# Patient Record
Sex: FEMALE | Race: WHITE | NOT HISPANIC OR LATINO | Employment: FULL TIME | URBAN - METROPOLITAN AREA
[De-identification: names, ages, dates, MRNs, and addresses within clinical notes are randomized per-mention and may not be internally consistent; named-entity substitution may affect disease eponyms.]

---

## 2017-02-08 RX ORDER — LISINOPRIL 5 MG/1
TABLET ORAL
Qty: 30 TABLET | Refills: 0 | Status: SHIPPED | OUTPATIENT
Start: 2017-02-08 | End: 2017-03-09 | Stop reason: SDUPTHER

## 2017-03-09 RX ORDER — LISINOPRIL 5 MG/1
TABLET ORAL
Qty: 30 TABLET | Refills: 2 | Status: SHIPPED | OUTPATIENT
Start: 2017-03-09 | End: 2017-06-12 | Stop reason: SDUPTHER

## 2017-03-22 ENCOUNTER — DOCUMENTATION (OUTPATIENT)
Dept: PHYSICAL THERAPY | Facility: CLINIC | Age: 47
End: 2017-03-22

## 2017-03-22 NOTE — PROGRESS NOTES
Discharge Summary  Discharge Summary from Physical Therapy Report      Dates  PT visit: 11/14/16, 11/28/16  Number of Visits: 2     Discharge Status of Patient: See daily note dated 11/28/16    Goals: Not Met    Discharge Plan: Continue with current home exercise program as instructed    Comments Patient attended only 2 visits of PT    Date of Discharge 03/22/17        Carolina Schwab, PT, DPT  Physical Therapist

## 2017-06-12 RX ORDER — LISINOPRIL 5 MG/1
TABLET ORAL
Qty: 30 TABLET | Refills: 2 | Status: SHIPPED | OUTPATIENT
Start: 2017-06-12 | End: 2017-09-12 | Stop reason: SDUPTHER

## 2017-06-15 ENCOUNTER — TELEPHONE (OUTPATIENT)
Dept: OBSTETRICS AND GYNECOLOGY | Facility: CLINIC | Age: 47
End: 2017-06-15

## 2017-09-12 RX ORDER — LISINOPRIL 5 MG/1
TABLET ORAL
Qty: 30 TABLET | Refills: 5 | Status: SHIPPED | OUTPATIENT
Start: 2017-09-12 | End: 2017-09-14

## 2017-09-14 ENCOUNTER — OFFICE VISIT (OUTPATIENT)
Dept: OBSTETRICS AND GYNECOLOGY | Facility: CLINIC | Age: 47
End: 2017-09-14

## 2017-09-14 VITALS
BODY MASS INDEX: 36.36 KG/M2 | DIASTOLIC BLOOD PRESSURE: 100 MMHG | SYSTOLIC BLOOD PRESSURE: 150 MMHG | WEIGHT: 205.2 LBS | HEIGHT: 63 IN

## 2017-09-14 DIAGNOSIS — N95.1 MENOPAUSAL SYMPTOMS: ICD-10-CM

## 2017-09-14 DIAGNOSIS — Z01.419 ENCOUNTER FOR GYNECOLOGICAL EXAMINATION WITHOUT ABNORMAL FINDING: Primary | ICD-10-CM

## 2017-09-14 DIAGNOSIS — R63.5 WEIGHT GAIN: ICD-10-CM

## 2017-09-14 PROCEDURE — 99396 PREV VISIT EST AGE 40-64: CPT | Performed by: OBSTETRICS & GYNECOLOGY

## 2017-09-14 NOTE — PROGRESS NOTES
Subjective    Chief Complaint   Patient presents with   • Gynecologic Exam     Discuss hormone issue      History of Present Illness    Nae Nicole is a 47 y.o. female who presents for annual exam. Mammogram today.  Patient wants to try Premarin 1.25 mg a day for extreme hot flashes so samples given with Rx.  Discussed calcium with vitamin D.  Patient has had a hysterectomy with BSO.  She is also had some weight gain and wants thyroid checked.    Obstetric History:  OB History      Para Term  AB TAB SAB Ectopic Multiple Living    2 2 2                Menstrual History:     No LMP recorded.       Past Medical History:   Diagnosis Date   • Abnormal thyroid blood test    • Abnormal weight gain    • GERD (gastroesophageal reflux disease)    • Hypertension    • Migraine    • Obesity    • Snores      Family History   Problem Relation Age of Onset   • Hypertension Father    • Hypothyroidism Maternal Aunt    • Diabetes Maternal Grandmother        The following portions of the patient's history were reviewed and updated as appropriate: allergies, current medications, past family history, past medical history, past social history, past surgical history and problem list.    Review of Systems   Constitutional: Positive for unexpected weight change. Negative for fever.   HENT: Negative.    Respiratory: Negative for shortness of breath and wheezing.    Cardiovascular: Negative for chest pain, palpitations and leg swelling.   Gastrointestinal: Negative for abdominal pain, anal bleeding and blood in stool.   Genitourinary: Negative for dysuria, pelvic pain, urgency, vaginal bleeding, vaginal discharge and vaginal pain.   Skin: Negative.    Neurological: Negative.         Hot flashes severe   Hematological: Negative.  Negative for adenopathy.   Psychiatric/Behavioral: Negative.  Negative for dysphoric mood. The patient is not nervous/anxious.             Objective   Physical Exam   Constitutional: She is oriented to  "person, place, and time. She appears well-developed and well-nourished.   HENT:   Head: Normocephalic.   Eyes: Pupils are equal, round, and reactive to light.   Neck: No tracheal deviation present. No thyromegaly present.   Cardiovascular: Normal rate, regular rhythm and normal heart sounds.    No murmur heard.  Pulmonary/Chest: Effort normal and breath sounds normal. No respiratory distress.   Breasts without masses, tenderness or nipple discharge   Abdominal: Soft. She exhibits no mass. There is no hepatosplenomegaly. There is no tenderness. No hernia.   Genitourinary: Rectum normal and vagina normal. No vaginal discharge found.   Genitourinary Comments: External genitalia normal   Lymphadenopathy:     She has no cervical adenopathy.     She has no axillary adenopathy.        Right: No inguinal adenopathy present.        Left: No inguinal adenopathy present.   Neurological: She is alert and oriented to person, place, and time.   Skin: Skin is warm and dry. No rash noted.   Psychiatric: She has a normal mood and affect. Her behavior is normal.   Vitals reviewed.      /100  Ht 63\" (160 cm)  Wt 205 lb 3.2 oz (93.1 kg)  BMI 36.35 kg/m2    Assessment/Plan   Nae was seen today for gynecologic exam.    Diagnoses and all orders for this visit:    Encounter for gynecological examination without abnormal finding  -     IGP,rfx Aptima HPV All Pth    Menopausal symptoms    Weight gain  -     Thyroid Panel With TSH    Other orders  -     estrogens, conjugated, (PREMARIN) 1.25 MG tablet; Take 1 tablet by mouth Daily.      Mammogram. RTO 1 year  Counseled about the importance of calcium with vitamin D for osteoporosis prevention.           "

## 2017-09-15 LAB
FT4I SERPL CALC-MCNC: 1.8 (ref 1.2–4.9)
T3RU NFR SERPL: 28 % (ref 24–39)
T4 SERPL-MCNC: 6.4 UG/DL (ref 4.5–12)
TSH SERPL DL<=0.005 MIU/L-ACNC: 3.73 UIU/ML (ref 0.45–4.5)

## 2017-09-19 LAB
CONV .: NORMAL
CYTOLOGIST CVX/VAG CYTO: NORMAL
CYTOLOGY CVX/VAG DOC THIN PREP: NORMAL
DX ICD CODE: NORMAL
HIV 1 & 2 AB SER-IMP: NORMAL
OTHER STN SPEC: NORMAL
PATH REPORT.FINAL DX SPEC: NORMAL
STAT OF ADQ CVX/VAG CYTO-IMP: NORMAL

## 2017-09-20 ENCOUNTER — TELEPHONE (OUTPATIENT)
Dept: OBSTETRICS AND GYNECOLOGY | Facility: CLINIC | Age: 47
End: 2017-09-20

## 2017-09-28 ENCOUNTER — TELEPHONE (OUTPATIENT)
Dept: OBSTETRICS AND GYNECOLOGY | Facility: CLINIC | Age: 47
End: 2017-09-28

## 2017-09-28 NOTE — TELEPHONE ENCOUNTER
----- Message from Dionicio Alfaro MD sent at 9/18/2017  4:31 PM EDT -----  Kofi please call patient and let her know her thyroid tests were totally normal.  Thank you.  ANY

## 2017-09-28 NOTE — TELEPHONE ENCOUNTER
Premarin tablets were very expensive.  Patient has used Estratest as well as patches in the past.  Estradiol 2 mg has not been working.  Patient will contact the pharmacy and see which her best financial options are.  ANY

## 2017-10-02 ENCOUNTER — TELEPHONE (OUTPATIENT)
Dept: OBSTETRICS AND GYNECOLOGY | Facility: CLINIC | Age: 47
End: 2017-10-02

## 2017-10-02 NOTE — TELEPHONE ENCOUNTER
Patient called back and said Estratest full strength was covered by her insurance.  #30 called into pharmacy with 12 refills.  ANY

## 2017-10-02 NOTE — TELEPHONE ENCOUNTER
Patient is considering Estratest full strength generic.  She will call her pharmacy first to see the cost and whether it is in fact still available and will let me know tomorrow.  ANY

## 2017-11-06 ENCOUNTER — TELEPHONE (OUTPATIENT)
Dept: OBSTETRICS AND GYNECOLOGY | Facility: CLINIC | Age: 47
End: 2017-11-06

## 2017-11-06 NOTE — TELEPHONE ENCOUNTER
Kofi this patient has called and wanted a refill for her full strength Estratest sent to her pharmacy.  This was already reportedly called in.  Could you check with her and make sure it was done properly.  She should be I believe on full strength Estratest generic, #30, one by mouth a day and 12 refills.  Thank you.  ANY

## 2018-03-19 RX ORDER — LISINOPRIL 5 MG/1
TABLET ORAL
Qty: 30 TABLET | Refills: 0 | Status: SHIPPED | OUTPATIENT
Start: 2018-03-19 | End: 2018-04-02

## 2018-04-02 ENCOUNTER — OFFICE VISIT (OUTPATIENT)
Dept: FAMILY MEDICINE CLINIC | Facility: CLINIC | Age: 48
End: 2018-04-02

## 2018-04-02 VITALS
SYSTOLIC BLOOD PRESSURE: 140 MMHG | WEIGHT: 202 LBS | OXYGEN SATURATION: 98 % | DIASTOLIC BLOOD PRESSURE: 88 MMHG | TEMPERATURE: 98.4 F | BODY MASS INDEX: 35.79 KG/M2 | HEIGHT: 63 IN | HEART RATE: 67 BPM

## 2018-04-02 DIAGNOSIS — Z13.220 LIPID SCREENING: ICD-10-CM

## 2018-04-02 DIAGNOSIS — G43.109 MIGRAINE WITH AURA AND WITHOUT STATUS MIGRAINOSUS, NOT INTRACTABLE: ICD-10-CM

## 2018-04-02 DIAGNOSIS — I10 BENIGN ESSENTIAL HYPERTENSION: Primary | ICD-10-CM

## 2018-04-02 PROCEDURE — 99214 OFFICE O/P EST MOD 30 MIN: CPT | Performed by: NURSE PRACTITIONER

## 2018-04-02 RX ORDER — SUMATRIPTAN 50 MG/1
50 TABLET, FILM COATED ORAL ONCE AS NEEDED
Qty: 9 TABLET | Refills: 0 | Status: SHIPPED | OUTPATIENT
Start: 2018-04-02 | End: 2019-07-26 | Stop reason: SDUPTHER

## 2018-04-02 RX ORDER — ESTERIFIED ESTROGEN AND METHYLTESTOSTERONE 1.25; 2.5 MG/1; MG/1
TABLET ORAL DAILY
Refills: 4 | COMMUNITY
Start: 2018-03-26 | End: 2019-04-26

## 2018-04-02 RX ORDER — LISINOPRIL 10 MG/1
10 TABLET ORAL DAILY
Qty: 30 TABLET | Refills: 0 | Status: SHIPPED | OUTPATIENT
Start: 2018-04-02 | End: 2018-05-17 | Stop reason: SDUPTHER

## 2018-04-02 NOTE — PROGRESS NOTES
"Subjective   Nae Nicole is a 48 y.o. female who presents for a routine follow up on hypertension. BP running high on home readings.     History of Present Illness   Noted higher BP for 9 months. Bottom number primarily elevated at home 95, has been off and on phentermine, not currently on and BP still high.   Follows with gyn for women's health.   Doing ok with turning 48, started weight watchers, finishing her first full week.  excedrin migraine does work for headaches, bitemporal, occ flashes in eyes, none down in the bed sick with in quite some time.   The following portions of the patient's history were reviewed and updated as appropriate: allergies, current medications, past family history, past medical history, past social history, past surgical history and problem list.    Review of Systems   Constitutional: Negative for activity change, appetite change, fatigue, unexpected weight gain and unexpected weight loss.   Respiratory: Negative.  Negative for shortness of breath.    Cardiovascular: Negative.  Negative for chest pain, palpitations and leg swelling.   Neurological: Positive for headaches (attributes to sinus or allergies. ).   Psychiatric/Behavioral: Negative.      /88   Pulse 67   Temp 98.4 °F (36.9 °C) (Oral)   Ht 160 cm (63\")   Wt 91.6 kg (202 lb)   SpO2 98%   BMI 35.78 kg/m²     Objective   Physical Exam   Constitutional: She appears well-developed and well-nourished.   Neck: Normal range of motion. Neck supple. No thyromegaly present.   Cardiovascular: Normal rate, regular rhythm and normal heart sounds.    Pulmonary/Chest: Effort normal and breath sounds normal.   Lymphadenopathy:     She has no cervical adenopathy.   Skin: Skin is warm and dry.   Psychiatric: She has a normal mood and affect. Her behavior is normal. Judgment and thought content normal.   Nursing note and vitals reviewed.    Assessment/Plan   Problems Addressed this Visit        Cardiovascular and Mediastinum    " Benign essential hypertension - Primary    Relevant Medications    lisinopril (PRINIVIL,ZESTRIL) 10 MG tablet    Other Relevant Orders    CBC & Differential    Comprehensive Metabolic Panel    Migraine    Relevant Medications    SUMAtriptan (IMITREX) 50 MG tablet       Other    Lipid screening    Relevant Orders    Lipid Panel      Other Visit Diagnoses    None.       HTN uncontrolled. Will increase lisinopril to 10mg, consider add back more regular HCTZ to control BP better.   Reasonable for imitrex to address headaches, as had success in the past.   FU 1 month for BP check, outpt fasting labs.   Continue care with gyn, colon cancer screening up to date.

## 2018-04-23 RX ORDER — LISINOPRIL 5 MG/1
TABLET ORAL
Qty: 30 TABLET | Refills: 0 | OUTPATIENT
Start: 2018-04-23

## 2018-05-17 ENCOUNTER — CLINICAL SUPPORT (OUTPATIENT)
Dept: FAMILY MEDICINE CLINIC | Facility: CLINIC | Age: 48
End: 2018-05-17

## 2018-05-17 VITALS
WEIGHT: 196 LBS | BODY MASS INDEX: 34.73 KG/M2 | DIASTOLIC BLOOD PRESSURE: 80 MMHG | HEIGHT: 63 IN | OXYGEN SATURATION: 96 % | HEART RATE: 84 BPM | SYSTOLIC BLOOD PRESSURE: 144 MMHG

## 2018-05-17 RX ORDER — LISINOPRIL 10 MG/1
10 TABLET ORAL DAILY
Qty: 30 TABLET | Refills: 0 | Status: SHIPPED | OUTPATIENT
Start: 2018-05-17 | End: 2018-06-20 | Stop reason: SDUPTHER

## 2018-06-21 RX ORDER — LISINOPRIL 10 MG/1
10 TABLET ORAL DAILY
Qty: 30 TABLET | Refills: 0 | Status: SHIPPED | OUTPATIENT
Start: 2018-06-21 | End: 2018-08-01 | Stop reason: SDUPTHER

## 2018-08-02 RX ORDER — LISINOPRIL 10 MG/1
10 TABLET ORAL DAILY
Qty: 30 TABLET | Refills: 3 | Status: SHIPPED | OUTPATIENT
Start: 2018-08-02 | End: 2020-08-19

## 2018-09-12 RX ORDER — LISINOPRIL 10 MG/1
10 TABLET ORAL DAILY
Qty: 30 TABLET | Refills: 3 | Status: SHIPPED | OUTPATIENT
Start: 2018-09-12 | End: 2018-09-27 | Stop reason: SDUPTHER

## 2018-09-27 ENCOUNTER — OFFICE VISIT (OUTPATIENT)
Dept: FAMILY MEDICINE CLINIC | Facility: CLINIC | Age: 48
End: 2018-09-27

## 2018-09-27 VITALS
WEIGHT: 195 LBS | OXYGEN SATURATION: 96 % | DIASTOLIC BLOOD PRESSURE: 80 MMHG | HEIGHT: 63 IN | TEMPERATURE: 98.4 F | HEART RATE: 72 BPM | BODY MASS INDEX: 34.55 KG/M2 | SYSTOLIC BLOOD PRESSURE: 138 MMHG

## 2018-09-27 DIAGNOSIS — J01.10 ACUTE NON-RECURRENT FRONTAL SINUSITIS: Primary | ICD-10-CM

## 2018-09-27 PROCEDURE — 99213 OFFICE O/P EST LOW 20 MIN: CPT | Performed by: NURSE PRACTITIONER

## 2018-09-27 RX ORDER — AMOXICILLIN 500 MG/1
1000 CAPSULE ORAL 2 TIMES DAILY
Qty: 28 CAPSULE | Refills: 0 | Status: SHIPPED | OUTPATIENT
Start: 2018-09-27 | End: 2019-04-26

## 2018-09-27 NOTE — PROGRESS NOTES
"Subjective   Nae Nicole is a 48 y.o. female who presents c/o cough, congestion, headache x 5 days. Was also dx with vertigo at immediate care last week.     History of Present Illness   Vertigo is resolved, short lived only. Was severe. Now with cough and congestion, headache x 5 days. Taking dayquil day and nighttime.   The following portions of the patient's history were reviewed and updated as appropriate: allergies, current medications, past family history, past medical history, past social history, past surgical history and problem list.    Review of Systems   Constitutional: Negative for chills and fever.   HENT: Positive for congestion, rhinorrhea, sinus pressure and sore throat.    Eyes: Positive for photophobia.   Respiratory: Positive for cough and chest tightness.    Cardiovascular: Negative.    Gastrointestinal: Negative.    Neurological: Positive for headache.     /80   Pulse 72   Temp 98.4 °F (36.9 °C) (Oral)   Ht 160 cm (63\")   Wt 88.5 kg (195 lb)   SpO2 96%   BMI 34.54 kg/m²     Objective   Physical Exam   Constitutional: She appears well-developed and well-nourished.   HENT:   Right Ear: A middle ear effusion is present.   Left Ear: A middle ear effusion is present.   Nose: Mucosal edema and rhinorrhea present. Right sinus exhibits frontal sinus tenderness. Left sinus exhibits frontal sinus tenderness.   Mouth/Throat: Uvula is midline and mucous membranes are normal. Posterior oropharyngeal erythema present. No oropharyngeal exudate.   Neck: Normal range of motion. Neck supple. No thyromegaly present.   Cardiovascular: Normal rate, regular rhythm and normal heart sounds.    Pulmonary/Chest: Effort normal and breath sounds normal.   Lymphadenopathy:     She has no cervical adenopathy.   Skin: Skin is warm and dry.   Psychiatric: She has a normal mood and affect. Her behavior is normal. Judgment and thought content normal.   Nursing note and vitals reviewed.    Assessment/Plan "   Problems Addressed this Visit     None      Visit Diagnoses     Acute non-recurrent frontal sinusitis    -  Primary    Relevant Medications    amoxicillin (AMOXIL) 500 MG capsule        Continue symptom controllers, follow up 1 week if symptoms not settling.

## 2018-11-14 DIAGNOSIS — Z12.31 VISIT FOR SCREENING MAMMOGRAM: Primary | ICD-10-CM

## 2018-11-16 ENCOUNTER — TRANSCRIBE ORDERS (OUTPATIENT)
Dept: ADMINISTRATIVE | Facility: HOSPITAL | Age: 48
End: 2018-11-16

## 2018-11-16 DIAGNOSIS — Z12.31 VISIT FOR SCREENING MAMMOGRAM: Primary | ICD-10-CM

## 2018-11-20 ENCOUNTER — APPOINTMENT (OUTPATIENT)
Dept: MAMMOGRAPHY | Facility: HOSPITAL | Age: 48
End: 2018-11-20
Attending: OBSTETRICS & GYNECOLOGY

## 2018-12-24 RX ORDER — ESTERIFIED ESTROGEN AND METHYLTESTOSTERONE 1.25; 2.5 MG/1; MG/1
TABLET ORAL
Qty: 30 TABLET | Refills: 0 | OUTPATIENT
Start: 2018-12-24

## 2018-12-24 NOTE — TELEPHONE ENCOUNTER
If you want pt to have this refll can you please send to pharm. It will not let me approve this medication.

## 2018-12-24 NOTE — TELEPHONE ENCOUNTER
Estratest cannot be e-prescribed.  Please call in full strength Estratest generic, #30, one by mouth a day, 5 refills.  Thank you.  ANY

## 2018-12-28 ENCOUNTER — APPOINTMENT (OUTPATIENT)
Dept: MAMMOGRAPHY | Facility: HOSPITAL | Age: 48
End: 2018-12-28
Attending: OBSTETRICS & GYNECOLOGY

## 2019-01-31 ENCOUNTER — TELEPHONE (OUTPATIENT)
Dept: OBSTETRICS AND GYNECOLOGY | Facility: CLINIC | Age: 49
End: 2019-01-31

## 2019-02-04 ENCOUNTER — TELEPHONE (OUTPATIENT)
Dept: OBSTETRICS AND GYNECOLOGY | Facility: CLINIC | Age: 49
End: 2019-02-04

## 2019-02-04 NOTE — TELEPHONE ENCOUNTER
After speaking with Dr. Alfaor we called in estradiol 1mg #90 with 3 refills which was on her list from the ins computer. heather

## 2019-02-04 NOTE — TELEPHONE ENCOUNTER
----- Message from Rashida Bullard sent at 2/4/2019 11:46 AM EST -----  PT calling to speak with you regarding issues getting medications. PT #[phone]

## 2019-02-25 RX ORDER — LISINOPRIL 10 MG/1
10 TABLET ORAL DAILY
Qty: 30 TABLET | Refills: 3 | Status: SHIPPED | OUTPATIENT
Start: 2019-02-25 | End: 2019-04-26 | Stop reason: SDUPTHER

## 2019-02-27 ENCOUNTER — OFFICE VISIT (OUTPATIENT)
Dept: OBSTETRICS AND GYNECOLOGY | Facility: CLINIC | Age: 49
End: 2019-02-27

## 2019-02-27 VITALS
BODY MASS INDEX: 37.43 KG/M2 | HEIGHT: 62 IN | WEIGHT: 203.4 LBS | DIASTOLIC BLOOD PRESSURE: 78 MMHG | SYSTOLIC BLOOD PRESSURE: 158 MMHG

## 2019-02-27 DIAGNOSIS — Z12.39 SCREENING FOR BREAST CANCER: ICD-10-CM

## 2019-02-27 DIAGNOSIS — N95.1 MENOPAUSAL SYMPTOMS: ICD-10-CM

## 2019-02-27 DIAGNOSIS — Z78.0 MENOPAUSE: ICD-10-CM

## 2019-02-27 DIAGNOSIS — Z01.419 ENCOUNTER FOR GYNECOLOGICAL EXAMINATION WITHOUT ABNORMAL FINDING: Primary | ICD-10-CM

## 2019-02-27 LAB
DEVELOPER EXPIRATION DATE: ABNORMAL
DEVELOPER LOT NUMBER: ABNORMAL
EXPIRATION DATE: ABNORMAL
FECAL OCCULT BLOOD SCREEN, POC: NEGATIVE
Lab: ABNORMAL
NEGATIVE CONTROL: NEGATIVE
POSITIVE CONTROL: POSITIVE

## 2019-02-27 PROCEDURE — 99396 PREV VISIT EST AGE 40-64: CPT | Performed by: OBSTETRICS & GYNECOLOGY

## 2019-02-27 PROCEDURE — 82274 ASSAY TEST FOR BLOOD FECAL: CPT | Performed by: OBSTETRICS & GYNECOLOGY

## 2019-02-27 RX ORDER — ESTRADIOL 1 MG/1
1 TABLET ORAL DAILY
COMMUNITY
End: 2019-04-26

## 2019-02-27 RX ORDER — ESTRADIOL 2 MG/1
2 TABLET ORAL DAILY
Qty: 30 TABLET | Refills: 12
Start: 2019-02-27 | End: 2021-05-03

## 2019-02-27 NOTE — PROGRESS NOTES
Subjective    Chief Complaint   Patient presents with   • Gynecologic Exam     ae, hyst w/bso, hot flashes, very moddy crying, had to change medication because ins would not cover, dosent feel like medication not strong enough       History of Present Illness    Nae Nicole is a 49 y.o. female who presents for annual exam.  Non-smoker.  Scheduling mammogram and DEXA scan.  Colonoscopy discussed and patient will decide.  Having a lot of hot flashes on 1 mg estradiol and wants to increase to 2 mg.  Previous hysterectomy with BSO.    Obstetric History:  OB History      Para Term  AB Living    2 2 2          SAB TAB Ectopic Molar Multiple Live Births                        Menstrual History:     No LMP recorded. Patient has had a hysterectomy.       Past Medical History:   Diagnosis Date   • Abnormal thyroid blood test    • Abnormal weight gain    • GERD (gastroesophageal reflux disease)    • Hypertension    • Migraine    • Obesity    • Snores      Family History   Problem Relation Age of Onset   • Hypertension Father    • Hypothyroidism Maternal Aunt    • Diabetes Maternal Grandmother      Social History     Tobacco Use   Smoking Status Never Smoker   Smokeless Tobacco Never Used     Counseling given: Not Answered      The following portions of the patient's history were reviewed and updated as appropriate: allergies, current medications, past family history, past medical history, past social history, past surgical history and problem list.    Review of Systems   Constitutional: Negative.  Negative for fever and unexpected weight change.   HENT: Negative.    Respiratory: Negative for shortness of breath and wheezing.    Cardiovascular: Negative for chest pain, palpitations and leg swelling.   Gastrointestinal: Negative for abdominal pain, anal bleeding and blood in stool.   Genitourinary: Negative for dysuria, pelvic pain, urgency, vaginal bleeding, vaginal discharge and vaginal pain.   Skin: Negative.   "  Neurological: Negative.         + Hot Flashes   Hematological: Negative.  Negative for adenopathy.   Psychiatric/Behavioral: Negative.  Negative for dysphoric mood. The patient is not nervous/anxious.             Objective   Physical Exam   Constitutional: She is oriented to person, place, and time. She appears well-developed and well-nourished.   HENT:   Head: Normocephalic.   Eyes: Pupils are equal, round, and reactive to light.   Neck: No tracheal deviation present. No thyromegaly present.   Cardiovascular: Normal rate, regular rhythm and normal heart sounds.   No murmur heard.  Pulmonary/Chest: Effort normal and breath sounds normal. No respiratory distress.   Breasts without masses, tenderness or nipple discharge   Abdominal: Soft. She exhibits no mass. There is no hepatosplenomegaly. There is no tenderness. No hernia.   Genitourinary: Rectum normal and vagina normal. Rectal exam shows guaiac negative stool. No vaginal discharge found.   Genitourinary Comments: External genitalia normal   Lymphadenopathy:     She has no cervical adenopathy.     She has no axillary adenopathy.        Right: No inguinal adenopathy present.        Left: No inguinal adenopathy present.   Neurological: She is alert and oriented to person, place, and time.   Skin: Skin is warm and dry. No rash noted.   Psychiatric: She has a normal mood and affect. Her behavior is normal.   Vitals reviewed.      /78   Ht 157.5 cm (62\")   Wt 92.3 kg (203 lb 6.4 oz)   BMI 37.20 kg/m²     Assessment/Plan   Nae was seen today for gynecologic exam.    Diagnoses and all orders for this visit:    Encounter for gynecological examination without abnormal finding  -     IGP,rfx Aptima HPV All Pth  -     POC Occult Blood Stool    Menopausal symptoms    Screening for breast cancer    Menopause    Other orders  -     estradiol (ESTRACE) 2 MG tablet; Take 1 tablet by mouth Daily.        Mammogram. Dexascan   RTO 1 year     Counseled about starting " calcium with vitamin D.  Also counseled about colorectal screening.

## 2019-03-13 ENCOUNTER — APPOINTMENT (OUTPATIENT)
Dept: WOMENS IMAGING | Facility: HOSPITAL | Age: 49
End: 2019-03-13

## 2019-03-13 ENCOUNTER — PROCEDURE VISIT (OUTPATIENT)
Dept: OBSTETRICS AND GYNECOLOGY | Facility: CLINIC | Age: 49
End: 2019-03-13

## 2019-03-13 DIAGNOSIS — Z78.0 POST-MENOPAUSAL: ICD-10-CM

## 2019-03-13 DIAGNOSIS — Z00.00 PREVENTATIVE HEALTH CARE: Primary | ICD-10-CM

## 2019-03-13 DIAGNOSIS — Z12.31 VISIT FOR SCREENING MAMMOGRAM: Primary | ICD-10-CM

## 2019-03-13 PROCEDURE — 77080 DXA BONE DENSITY AXIAL: CPT | Performed by: OBSTETRICS & GYNECOLOGY

## 2019-03-13 PROCEDURE — 77067 SCR MAMMO BI INCL CAD: CPT | Performed by: OBSTETRICS & GYNECOLOGY

## 2019-03-13 PROCEDURE — 77067 SCR MAMMO BI INCL CAD: CPT | Performed by: RADIOLOGY

## 2019-03-14 ENCOUNTER — TELEPHONE (OUTPATIENT)
Dept: OBSTETRICS AND GYNECOLOGY | Facility: CLINIC | Age: 49
End: 2019-03-14

## 2019-04-26 ENCOUNTER — OFFICE VISIT (OUTPATIENT)
Dept: FAMILY MEDICINE CLINIC | Facility: CLINIC | Age: 49
End: 2019-04-26

## 2019-04-26 VITALS
BODY MASS INDEX: 36.99 KG/M2 | DIASTOLIC BLOOD PRESSURE: 74 MMHG | SYSTOLIC BLOOD PRESSURE: 138 MMHG | TEMPERATURE: 98.3 F | HEIGHT: 62 IN | WEIGHT: 201 LBS | HEART RATE: 66 BPM | OXYGEN SATURATION: 98 %

## 2019-04-26 DIAGNOSIS — B37.31 YEAST VAGINITIS: ICD-10-CM

## 2019-04-26 DIAGNOSIS — R39.9 UTI SYMPTOMS: Primary | ICD-10-CM

## 2019-04-26 LAB
BILIRUB BLD-MCNC: NEGATIVE MG/DL
CLARITY, POC: CLEAR
COLOR UR: YELLOW
GLUCOSE UR STRIP-MCNC: NEGATIVE MG/DL
KETONES UR QL: NEGATIVE
LEUKOCYTE EST, POC: NEGATIVE
NITRITE UR-MCNC: NEGATIVE MG/ML
PH UR: 5 [PH] (ref 5–8)
PROT UR STRIP-MCNC: NEGATIVE MG/DL
RBC # UR STRIP: NEGATIVE /UL
SP GR UR: 1.01 (ref 1–1.03)
UROBILINOGEN UR QL: NORMAL

## 2019-04-26 PROCEDURE — 99213 OFFICE O/P EST LOW 20 MIN: CPT | Performed by: NURSE PRACTITIONER

## 2019-04-26 PROCEDURE — 81003 URINALYSIS AUTO W/O SCOPE: CPT | Performed by: NURSE PRACTITIONER

## 2019-04-26 RX ORDER — FLUCONAZOLE 150 MG/1
150 TABLET ORAL DAILY
Qty: 2 TABLET | Refills: 0 | Status: SHIPPED | OUTPATIENT
Start: 2019-04-26 | End: 2019-04-28

## 2019-04-26 NOTE — PROGRESS NOTES
"Subjective   Nae Nicole is a 49 y.o. female who presents c/o dysuria, frequent urination since last week.     History of Present Illness   Initially thought yeast infection and symptoms worsening, after treatment. Increasing back pain. Not drinking extra fluids. Having burning and itching.   The following portions of the patient's history were reviewed and updated as appropriate: allergies, current medications, past family history, past medical history, past social history, past surgical history and problem list.    Review of Systems   Constitutional: Negative for chills and fever.   Genitourinary: Positive for dysuria and frequency. Negative for urinary incontinence, difficulty urinating, flank pain and urgency.        Itching   Musculoskeletal: Positive for back pain.   Hematological: Negative.    Psychiatric/Behavioral: Negative.      /74   Pulse 66   Temp 98.3 °F (36.8 °C) (Oral)   Ht 157.5 cm (62\")   Wt 91.2 kg (201 lb)   SpO2 98%   BMI 36.76 kg/m²     Objective   Physical Exam   Constitutional: She appears well-developed and well-nourished. No distress.   Abdominal: Soft. There is no tenderness. There is no CVA tenderness.   Psychiatric: She has a normal mood and affect. Her speech is normal and behavior is normal.   Nursing note and vitals reviewed.    Assessment/Plan   Problems Addressed this Visit     None      Visit Diagnoses     UTI symptoms    -  Primary    Relevant Orders    POCT urinalysis dipstick, automated (Completed)    Yeast vaginitis        Relevant Medications    fluconazole (DIFLUCAN) 150 MG tablet        Yeast vaginitis--symptoms should be settled by Monday. Schedule annual physical    Results for orders placed or performed in visit on 04/26/19   POCT urinalysis dipstick, automated   Result Value Ref Range    Color Yellow Yellow, Straw, Dark Yellow, Keely    Clarity, UA Clear Clear    Specific Gravity  1.015 1.005 - 1.030    pH, Urine 5.0 5.0 - 8.0    Leukocytes Negative " Negative    Nitrite, UA Negative Negative    Protein, POC Negative Negative mg/dL    Glucose, UA Negative Negative, 1000 mg/dL (3+) mg/dL    Ketones, UA Negative Negative    Urobilinogen, UA Normal Normal    Bilirubin Negative Negative    Blood, UA Negative Negative

## 2019-05-02 RX ORDER — LISINOPRIL 10 MG/1
10 TABLET ORAL DAILY
Qty: 90 TABLET | Refills: 1 | Status: SHIPPED | OUTPATIENT
Start: 2019-05-02 | End: 2019-07-26 | Stop reason: SDUPTHER

## 2019-07-26 ENCOUNTER — OFFICE VISIT (OUTPATIENT)
Dept: FAMILY MEDICINE CLINIC | Facility: CLINIC | Age: 49
End: 2019-07-26

## 2019-07-26 VITALS
TEMPERATURE: 98.1 F | BODY MASS INDEX: 37.73 KG/M2 | SYSTOLIC BLOOD PRESSURE: 128 MMHG | DIASTOLIC BLOOD PRESSURE: 70 MMHG | WEIGHT: 205 LBS | HEART RATE: 73 BPM | HEIGHT: 62 IN | OXYGEN SATURATION: 96 %

## 2019-07-26 DIAGNOSIS — K58.2 IRRITABLE BOWEL SYNDROME WITH BOTH CONSTIPATION AND DIARRHEA: Primary | ICD-10-CM

## 2019-07-26 DIAGNOSIS — M79.671 RIGHT FOOT PAIN: ICD-10-CM

## 2019-07-26 PROCEDURE — 99213 OFFICE O/P EST LOW 20 MIN: CPT | Performed by: NURSE PRACTITIONER

## 2019-07-26 RX ORDER — DICYCLOMINE HYDROCHLORIDE 10 MG/1
10 CAPSULE ORAL
Qty: 120 CAPSULE | Refills: 3 | Status: SHIPPED | OUTPATIENT
Start: 2019-07-26 | End: 2021-05-03

## 2019-07-26 RX ORDER — SUMATRIPTAN 50 MG/1
50 TABLET, FILM COATED ORAL ONCE AS NEEDED
Qty: 9 TABLET | Refills: 3 | Status: SHIPPED | OUTPATIENT
Start: 2019-07-26

## 2019-07-26 NOTE — PROGRESS NOTES
"Subjective   Nae Nicole is a 49 y.o. female who presents c/o abdominal pain, bloating, diarrhea, vomiting off and on x several weeks.     History of Present Illness   3 weeks of lower abdominal cramping will lay in the bathroom floor. Eats salads a lot. Gets constipated, diarrhea will last all night long, then will normalize. Associated cramping. + nausea and vomiting. Had Monday, and not really eaten much since except French Fries as don't bother stomach. Gets swollen and bloated, can't stand clothing to touch it when swollen. Associated gas. +heartburn. Took some of mom's antispasm pills and phenergan, some help. Mom with diverticulitis. Has chronic constipation that has always managed with laxatives, but these cause cramping and diarrhea.   Pain in R foot where had cyst, now has pain all the time.   The following portions of the patient's history were reviewed and updated as appropriate: allergies, current medications, past family history, past medical history, past social history, past surgical history and problem list.    Review of Systems   Constitutional: Negative for chills and fever.   Respiratory: Negative.    Cardiovascular: Negative.    Gastrointestinal: Positive for abdominal distention, abdominal pain, constipation, diarrhea, nausea, vomiting and indigestion. Negative for anal bleeding, blood in stool and rectal pain.   Musculoskeletal: Positive for arthralgias.   Neurological: Negative.    Hematological: Negative.    Psychiatric/Behavioral: Negative.      /70   Pulse 73   Temp 98.1 °F (36.7 °C) (Oral)   Ht 157.5 cm (62\")   Wt 93 kg (205 lb)   SpO2 96%   BMI 37.49 kg/m²     Objective   Physical Exam   Constitutional: She appears well-developed and well-nourished. No distress.   Cardiovascular: Normal rate.   Pulmonary/Chest: Effort normal.   Abdominal: Soft. Bowel sounds are normal. She exhibits no distension. There is no hepatosplenomegaly or hepatomegaly. There is tenderness in the " left lower quadrant. There is no rigidity, no rebound, no guarding, no CVA tenderness, no tenderness at McBurney's point and negative Vargas's sign. No hernia.   Musculoskeletal:        Right ankle: Normal.        Right foot: There is tenderness and bony tenderness. There is normal range of motion, no swelling, normal capillary refill, no crepitus, no deformity and no laceration.   Skin: Skin is warm and dry.   Psychiatric: She has a normal mood and affect. Her behavior is normal. Judgment and thought content normal.   Nursing note and vitals reviewed.    Assessment/Plan   Problems Addressed this Visit     None      Visit Diagnoses     Irritable bowel syndrome with both constipation and diarrhea    -  Primary    Relevant Medications    dicyclomine (BENTYL) 10 MG capsule    Right foot pain            IBS--recommend miralax to manage constipation, trial of dicyclomine for the cramping. Consider GI referral if not settling.   R foot pain--recommend OTC NSAID to address prn

## 2019-12-09 RX ORDER — LISINOPRIL 10 MG/1
10 TABLET ORAL DAILY
Qty: 90 TABLET | Refills: 0 | Status: SHIPPED | OUTPATIENT
Start: 2019-12-09 | End: 2020-02-14

## 2020-01-06 ENCOUNTER — TELEPHONE (OUTPATIENT)
Dept: OBSTETRICS AND GYNECOLOGY | Facility: CLINIC | Age: 50
End: 2020-01-06

## 2020-01-06 NOTE — TELEPHONE ENCOUNTER
Patient called and said she does not really like estradiol due to it giving her hot flashes and making her rodgers. Patient said you had given her a couple samples of premarin before but her insurance wouldn't cover it, she said the insurance she has now will cover it and was wondering if you could call her in a prescription for it.

## 2020-01-06 NOTE — TELEPHONE ENCOUNTER
I can send Premarin 1.25 mg to her pharmacy.  I need to know which pharmacy she uses and how many pills at a time they will fill such as 1 month or 3 months.  Please let me know and I will put it in the computer.  Thank you.  ANY

## 2020-02-14 RX ORDER — LISINOPRIL 10 MG/1
10 TABLET ORAL DAILY
Qty: 90 TABLET | Refills: 0 | Status: SHIPPED | OUTPATIENT
Start: 2020-02-14 | End: 2020-05-11

## 2020-05-11 RX ORDER — LISINOPRIL 10 MG/1
10 TABLET ORAL DAILY
Qty: 90 TABLET | Refills: 0 | Status: SHIPPED | OUTPATIENT
Start: 2020-05-11 | End: 2020-08-14

## 2020-08-14 RX ORDER — LISINOPRIL 10 MG/1
10 TABLET ORAL DAILY
Qty: 90 TABLET | Refills: 0 | Status: SHIPPED | OUTPATIENT
Start: 2020-08-14 | End: 2020-08-19

## 2020-08-19 ENCOUNTER — OFFICE VISIT (OUTPATIENT)
Dept: FAMILY MEDICINE CLINIC | Facility: CLINIC | Age: 50
End: 2020-08-19

## 2020-08-19 DIAGNOSIS — I10 BENIGN ESSENTIAL HYPERTENSION: ICD-10-CM

## 2020-08-19 DIAGNOSIS — E66.01 MORBID OBESITY (HCC): ICD-10-CM

## 2020-08-19 DIAGNOSIS — Z11.59 NEED FOR HEPATITIS C SCREENING TEST: ICD-10-CM

## 2020-08-19 DIAGNOSIS — E78.2 MIXED HYPERLIPIDEMIA: Primary | ICD-10-CM

## 2020-08-19 DIAGNOSIS — G43.109 MIGRAINE WITH AURA AND WITHOUT STATUS MIGRAINOSUS, NOT INTRACTABLE: ICD-10-CM

## 2020-08-19 PROCEDURE — 99443 PR PHYS/QHP TELEPHONE EVALUATION 21-30 MIN: CPT | Performed by: NURSE PRACTITIONER

## 2020-08-19 RX ORDER — LISINOPRIL 10 MG/1
10 TABLET ORAL DAILY
Qty: 90 TABLET | Refills: 0 | Status: SHIPPED | OUTPATIENT
Start: 2020-08-19 | End: 2021-07-22

## 2020-08-19 RX ORDER — PHENTERMINE HYDROCHLORIDE 30 MG/1
30 CAPSULE ORAL EVERY MORNING
Qty: 30 CAPSULE | Refills: 0 | Status: SHIPPED | OUTPATIENT
Start: 2020-08-19 | End: 2021-05-03

## 2020-08-19 NOTE — PROGRESS NOTES
Subjective   Nae Nicole is a 50 y.o. female. Annual FU  History of Present Illness   Does think health is doing ok overall. Weight is up about 15 lbs and currently working as . 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 Has gained about 15 lbs and wonders about appetite suppressant. Migraines have been well controlled, but has had HA that she attributes to  The face shield she is wearing at work and computer glare.  The following portions of the patient's history were reviewed and updated as appropriate: allergies, current medications, past family history, past medical history, past social history, past surgical history and problem list.    Review of Systems   Constitutional: Positive for appetite change and unexpected weight gain. Negative for diaphoresis, fatigue and fever.   Respiratory: Negative.    Cardiovascular: Negative.    Gastrointestinal: Negative.    Endocrine: Negative.    Musculoskeletal: Positive for arthralgias.   Neurological: Positive for headache.   Psychiatric/Behavioral: Negative.        Objective   Physical Exam   Constitutional: She is oriented to person, place, and time. She appears well-developed and well-nourished. No distress.   Pulmonary/Chest: Effort normal.   Neurological: She is alert and oriented to person, place, and time.   Skin: Skin is dry. She is not diaphoretic.   Psychiatric: She has a normal mood and affect. Her behavior is normal. Judgment and thought content normal.     Assessment/Plan   Problems Addressed this Visit        Cardiovascular and Mediastinum    Benign essential hypertension    Relevant Medications    lisinopril (PRINIVIL,ZESTRIL) 10 MG tablet    Other Relevant Orders    CBC & Differential    Comprehensive Metabolic Panel    Migraine    Mixed hyperlipidemia - Primary    Relevant Orders    Lipid Panel       Digestive    Morbid obesity (CMS/HCC)    Relevant Medications    phentermine 30 MG capsule         HTN--has been well controlled--due for lab update--to check carefully while initiating phentermine  Migraines--currently well controlled. Continue abortive therapy only  HLD--last checked in 2016--need update  Obesity--plans to start weight watchers online. Ok for phentermine adjunct if watches out for potential side effects including an increase in BP or HA. To stop right away if occurs.   You have chosen to receive care through a telephone visit. Do you consent to use a telephone visit for your medical care today? Yes  Call with BP 1 month, should be <135/85. FU 3 months  New grandbaby and Tdap out of date, should update.      25 min EM

## 2020-12-10 ENCOUNTER — TELEPHONE (OUTPATIENT)
Dept: FAMILY MEDICINE CLINIC | Facility: CLINIC | Age: 50
End: 2020-12-10

## 2020-12-10 DIAGNOSIS — U07.1 COVID-19: Primary | ICD-10-CM

## 2020-12-10 RX ORDER — DEXTROMETHORPHAN HYDROBROMIDE AND PROMETHAZINE HYDROCHLORIDE 15; 6.25 MG/5ML; MG/5ML
5 SYRUP ORAL 4 TIMES DAILY PRN
Qty: 180 ML | Refills: 0 | Status: SHIPPED | OUTPATIENT
Start: 2020-12-10 | End: 2021-05-03

## 2020-12-10 RX ORDER — ONDANSETRON 4 MG/1
4 TABLET, ORALLY DISINTEGRATING ORAL EVERY 8 HOURS PRN
Qty: 24 TABLET | Refills: 0 | Status: SHIPPED | OUTPATIENT
Start: 2020-12-10 | End: 2021-12-20 | Stop reason: SDUPTHER

## 2020-12-10 NOTE — TELEPHONE ENCOUNTER
Patient states she was diagnosed with Covid on 11/30. She would like to know if anything can be called in for her congestion, cough and nausea. [facility]    Please call patient at [phone].

## 2021-01-26 RX ORDER — ESTROGENS, CONJUGATED 1.25 MG
TABLET ORAL
Qty: 90 TABLET | Refills: 1 | Status: SHIPPED | OUTPATIENT
Start: 2021-01-26 | End: 2021-05-03

## 2021-02-01 ENCOUNTER — TELEPHONE (OUTPATIENT)
Dept: OBSTETRICS AND GYNECOLOGY | Facility: CLINIC | Age: 51
End: 2021-02-01

## 2021-02-01 RX ORDER — ESTRADIOL 1 MG/1
1 TABLET ORAL DAILY
Qty: 90 TABLET | Refills: 3 | Status: SHIPPED | OUTPATIENT
Start: 2021-02-01 | End: 2021-07-22 | Stop reason: SDUPTHER

## 2021-02-01 NOTE — TELEPHONE ENCOUNTER
Patient called and stated that RX Premarin 1.25 MG tablet [4947] is no longer covered by her insurance and wanted to know if something else could be called in. She said her insurance will cover estradiol.     Pharmacy - [facility]/pharmacy #[id number] - [place], [place] - [address] - [phone] [facility] [phone] FX

## 2021-02-01 NOTE — TELEPHONE ENCOUNTER
Tell patient I have sent 1 mg estradiol to her pharmacy.  We will see how that dose does for her.  ANY

## 2021-05-03 ENCOUNTER — OFFICE VISIT (OUTPATIENT)
Dept: FAMILY MEDICINE CLINIC | Facility: CLINIC | Age: 51
End: 2021-05-03

## 2021-05-03 VITALS
TEMPERATURE: 97.1 F | SYSTOLIC BLOOD PRESSURE: 122 MMHG | WEIGHT: 202 LBS | DIASTOLIC BLOOD PRESSURE: 76 MMHG | OXYGEN SATURATION: 98 % | HEART RATE: 71 BPM | BODY MASS INDEX: 37.17 KG/M2 | HEIGHT: 62 IN

## 2021-05-03 DIAGNOSIS — U07.1 DIARRHEA DUE TO COVID-19: ICD-10-CM

## 2021-05-03 DIAGNOSIS — I10 BENIGN ESSENTIAL HYPERTENSION: ICD-10-CM

## 2021-05-03 DIAGNOSIS — E66.01 MORBID OBESITY (HCC): ICD-10-CM

## 2021-05-03 DIAGNOSIS — U07.1 COVID TOES: ICD-10-CM

## 2021-05-03 DIAGNOSIS — M79.671 RIGHT FOOT PAIN: ICD-10-CM

## 2021-05-03 DIAGNOSIS — E78.2 MIXED HYPERLIPIDEMIA: Primary | ICD-10-CM

## 2021-05-03 DIAGNOSIS — R23.8 COVID TOES: ICD-10-CM

## 2021-05-03 DIAGNOSIS — A08.39 DIARRHEA DUE TO COVID-19: ICD-10-CM

## 2021-05-03 PROCEDURE — 99215 OFFICE O/P EST HI 40 MIN: CPT | Performed by: NURSE PRACTITIONER

## 2021-05-03 RX ORDER — PHENTERMINE HYDROCHLORIDE 30 MG/1
30 CAPSULE ORAL EVERY MORNING
Qty: 30 CAPSULE | Refills: 0 | Status: SHIPPED | OUTPATIENT
Start: 2021-05-03 | End: 2022-04-21 | Stop reason: SDUPTHER

## 2021-05-03 RX ORDER — MONTELUKAST SODIUM 4 MG/1
1 TABLET, CHEWABLE ORAL 2 TIMES DAILY
Qty: 60 TABLET | Refills: 0 | Status: SHIPPED | OUTPATIENT
Start: 2021-05-03 | End: 2021-05-26

## 2021-05-03 NOTE — PROGRESS NOTES
"Subjective   Nae Nicole is a 51 y.o. female who presents c/o diarrhea x several months. Started after having covid in November. Also with red area on right toe.    History of Present Illness   Diarrhea to point can't get out of restaurant typically 4 episodes daily. Very rare alcohol intake. Started after COVID in November. No antibiotics at the time.  Dicyclomine previous was for cramping and constipation.   Worried diabetic as lesion on R toe x 2 weeks.   Wants to restart phentermine for weight.   The following portions of the patient's history were reviewed and updated as appropriate: allergies, current medications, past family history, past medical history, past social history, past surgical history and problem list.    Review of Systems   Constitutional: Positive for appetite change and unexpected weight change. Negative for chills and fever.   Respiratory: Negative.    Cardiovascular: Negative.    Gastrointestinal: Positive for abdominal distention and diarrhea. Negative for blood in stool and nausea.   Neurological: Negative for facial asymmetry and headaches.   Psychiatric/Behavioral: Negative for decreased concentration. The patient is nervous/anxious.      /76   Pulse 71   Temp 97.1 °F (36.2 °C) (Infrared)   Ht 157.5 cm (62\")   Wt 91.6 kg (202 lb)   SpO2 98%   BMI 36.95 kg/m²     Objective   Physical Exam  Constitutional:       Appearance: She is well-developed. She is not diaphoretic.   HENT:      Head: Normocephalic and atraumatic.   Neck:      Thyroid: No thyromegaly.   Cardiovascular:      Rate and Rhythm: Normal rate and regular rhythm.      Pulses:           Carotid pulses are 2+ on the right side and 2+ on the left side.       Dorsalis pedis pulses are 2+ on the right side and 2+ on the left side.      Heart sounds: Normal heart sounds.   Pulmonary:      Effort: Pulmonary effort is normal.      Breath sounds: Normal breath sounds.   Abdominal:      General: Abdomen is protuberant. " Bowel sounds are normal.      Palpations: Abdomen is soft.      Tenderness: There is no abdominal tenderness.   Musculoskeletal:      Cervical back: Normal range of motion and neck supple.   Lymphadenopathy:      Cervical: No cervical adenopathy.   Skin:     Capillary Refill: Capillary refill takes less than 2 seconds.      Comments: Ecchymosis tip of R 3rd toe, blanches with pressure   Psychiatric:         Behavior: Behavior normal.         Thought Content: Thought content normal.         Judgment: Judgment normal.       Assessment/Plan   Problems Addressed this Visit        Cardiac and Vasculature    Benign essential hypertension    Relevant Orders    CBC (No Diff)    Comprehensive Metabolic Panel    Mixed hyperlipidemia - Primary    Relevant Medications    colestipol (COLESTID) 1 g tablet    Other Relevant Orders    Lipid Panel       Endocrine and Metabolic    Morbid obesity (CMS/[facility])    Relevant Medications    phentermine 30 MG capsule      Other Visit Diagnoses     Right foot pain        Relevant Orders    TSH    Hemoglobin A1c    Vitamin B12    Diarrhea due to COVID-19        Relevant Medications    colestipol (COLESTID) 1 g tablet    Other Relevant Orders    Ambulatory Referral to Gastroenterology    COVID toes          Diagnoses       Codes Comments    Mixed hyperlipidemia    -  Primary ICD-10-CM: E78.2  ICD-9-CM: 272.2     Benign essential hypertension     ICD-10-CM: I10  ICD-9-CM: 401.1     Right foot pain     ICD-10-CM: M79.671  ICD-9-CM: 729.5     Diarrhea due to COVID-19     ICD-10-CM: U07.1, A08.39  ICD-9-CM: 008.69, 079.89     Morbid obesity (CMS/[facility])     ICD-10-CM: E66.01  ICD-9-CM: 278.01     COVID toes     ICD-10-CM: U07.1, R23.8  ICD-9-CM: 709.8, 079.89         HLD--goal LDL <100  HTN--to watch BP carefully with phentermine as some with high BP post COVID  COVID toe--very small involved area. Would simply monitor  Post COVID diarrhea--start colestipol and refer to GI--has never had colonoscopy and  overdue  Obesity--ok to restart phentermine--work on diet and exercise. Various diets discussed    LIAM Report:      As part of this patient's treatment plan, I am prescribing controlled substances. The patient has been made aware of appropriate use of such medications, including potential risk of somnolence, limited ability to drive and /or work safely, and potential for dependence or overdose. It has also been made clear that these medications are for use by this patient only, without concomitant use of alcohol or other substances unless prescribed.    LIAM report has been reviewed by: RHONDA Giron on 05/03/21.  The report was not scanned into the patient's chart.    Date of last LIAM:  5/3/2021    Multiple problems reviewed. Increased complexity MDM--45 min EM

## 2021-05-04 ENCOUNTER — TELEPHONE (OUTPATIENT)
Dept: FAMILY MEDICINE CLINIC | Facility: CLINIC | Age: 51
End: 2021-05-04

## 2021-05-04 LAB
ALBUMIN SERPL-MCNC: 4.4 G/DL (ref 3.5–5.2)
ALBUMIN/GLOB SERPL: 2 G/DL
ALP SERPL-CCNC: 58 U/L (ref 39–117)
ALT SERPL-CCNC: 11 U/L (ref 1–33)
AST SERPL-CCNC: 15 U/L (ref 1–32)
BILIRUB SERPL-MCNC: 0.6 MG/DL (ref 0–1.2)
BUN SERPL-MCNC: 18 MG/DL (ref 6–20)
BUN/CREAT SERPL: 21.7 (ref 7–25)
CALCIUM SERPL-MCNC: 9.5 MG/DL (ref 8.6–10.5)
CHLORIDE SERPL-SCNC: 105 MMOL/L (ref 98–107)
CHOLEST SERPL-MCNC: 222 MG/DL (ref 0–200)
CO2 SERPL-SCNC: 27.5 MMOL/L (ref 22–29)
CREAT SERPL-MCNC: 0.83 MG/DL (ref 0.57–1)
ERYTHROCYTE [DISTWIDTH] IN BLOOD BY AUTOMATED COUNT: 13.1 % (ref 12.3–15.4)
GLOBULIN SER CALC-MCNC: 2.2 GM/DL
GLUCOSE SERPL-MCNC: 84 MG/DL (ref 65–99)
HBA1C MFR BLD: 5.2 % (ref 4.8–5.6)
HCT VFR BLD AUTO: 44.6 % (ref 34–46.6)
HDLC SERPL-MCNC: 51 MG/DL (ref 40–60)
HGB BLD-MCNC: 14.9 G/DL (ref 12–15.9)
LDLC SERPL CALC-MCNC: 142 MG/DL (ref 0–100)
MCH RBC QN AUTO: 29.3 PG (ref 26.6–33)
MCHC RBC AUTO-ENTMCNC: 33.4 G/DL (ref 31.5–35.7)
MCV RBC AUTO: 87.8 FL (ref 79–97)
PLATELET # BLD AUTO: 239 10*3/MM3 (ref 140–450)
POTASSIUM SERPL-SCNC: 4.7 MMOL/L (ref 3.5–5.2)
PROT SERPL-MCNC: 6.6 G/DL (ref 6–8.5)
RBC # BLD AUTO: 5.08 10*6/MM3 (ref 3.77–5.28)
SODIUM SERPL-SCNC: 141 MMOL/L (ref 136–145)
TRIGL SERPL-MCNC: 164 MG/DL (ref 0–150)
TSH SERPL DL<=0.005 MIU/L-ACNC: 3.6 UIU/ML (ref 0.27–4.2)
VIT B12 SERPL-MCNC: 250 PG/ML (ref 211–946)
VLDLC SERPL CALC-MCNC: 29 MG/DL (ref 5–40)
WBC # BLD AUTO: 6.56 10*3/MM3 (ref 3.4–10.8)

## 2021-05-04 RX ORDER — SYRINGE W-NEEDLE,DISPOSAB,3 ML 25GX5/8"
SYRINGE, EMPTY DISPOSABLE MISCELLANEOUS
Qty: 6 EACH | Refills: 0 | Status: SHIPPED | OUTPATIENT
Start: 2021-05-04 | End: 2022-04-21 | Stop reason: SDUPTHER

## 2021-05-04 RX ORDER — CYANOCOBALAMIN 1000 UG/ML
INJECTION, SOLUTION INTRAMUSCULAR; SUBCUTANEOUS
Qty: 6 ML | Refills: 0 | Status: SHIPPED | OUTPATIENT
Start: 2021-05-04 | End: 2021-08-31

## 2021-05-04 NOTE — PROGRESS NOTES
Please call the patient regarding her abnormal result. Cholesterol up a little, so work on diet and weight as discussed. B12 really low. Can cause peripheral neuropathy. Start weekly b12 injections x 4 then monthly. This is likely exacerbated by diarrhea

## 2021-05-07 ENCOUNTER — APPOINTMENT (OUTPATIENT)
Dept: WOMENS IMAGING | Facility: HOSPITAL | Age: 51
End: 2021-05-07

## 2021-05-07 ENCOUNTER — PROCEDURE VISIT (OUTPATIENT)
Dept: OBSTETRICS AND GYNECOLOGY | Facility: CLINIC | Age: 51
End: 2021-05-07

## 2021-05-07 DIAGNOSIS — Z12.31 VISIT FOR SCREENING MAMMOGRAM: Primary | ICD-10-CM

## 2021-05-07 PROCEDURE — 77067 SCR MAMMO BI INCL CAD: CPT | Performed by: OBSTETRICS & GYNECOLOGY

## 2021-05-07 PROCEDURE — 77063 BREAST TOMOSYNTHESIS BI: CPT | Performed by: RADIOLOGY

## 2021-05-07 PROCEDURE — 77067 SCR MAMMO BI INCL CAD: CPT | Performed by: RADIOLOGY

## 2021-05-07 PROCEDURE — 77063 BREAST TOMOSYNTHESIS BI: CPT | Performed by: OBSTETRICS & GYNECOLOGY

## 2021-05-13 ENCOUNTER — TELEPHONE (OUTPATIENT)
Dept: FAMILY MEDICINE CLINIC | Facility: CLINIC | Age: 51
End: 2021-05-13

## 2021-05-13 NOTE — TELEPHONE ENCOUNTER
Caller: Nae Nicole    Relationship to patient: Self    Best call back number: [phone]    Patient is needing: PATIENT STATES PHARMACY TOLD HER THEY DID NOT HAVE THE SYRINGES AND WOULD TAKE A FEW WEEKS TO GET THEM IN STOCK. PATIENT IS WANTING TO KNOW IF SHE CAN COME INTO OFFICE TOMORROW 05/14/2021 OR Monday 05/17/2021 FOR A B12 INJECTION. CALLBACK REQUESTED

## 2021-05-17 ENCOUNTER — CLINICAL SUPPORT (OUTPATIENT)
Dept: FAMILY MEDICINE CLINIC | Facility: CLINIC | Age: 51
End: 2021-05-17

## 2021-05-17 DIAGNOSIS — E53.8 B12 DEFICIENCY: Primary | ICD-10-CM

## 2021-05-17 PROCEDURE — 96372 THER/PROPH/DIAG INJ SC/IM: CPT | Performed by: NURSE PRACTITIONER

## 2021-05-17 RX ORDER — CYANOCOBALAMIN 1000 UG/ML
1000 INJECTION, SOLUTION INTRAMUSCULAR; SUBCUTANEOUS
Status: SHIPPED | OUTPATIENT
Start: 2021-05-17

## 2021-05-17 RX ADMIN — CYANOCOBALAMIN 1000 MCG: 1000 INJECTION, SOLUTION INTRAMUSCULAR; SUBCUTANEOUS at 11:04

## 2021-05-26 ENCOUNTER — OFFICE VISIT (OUTPATIENT)
Dept: OBSTETRICS AND GYNECOLOGY | Facility: CLINIC | Age: 51
End: 2021-05-26

## 2021-05-26 ENCOUNTER — CLINICAL SUPPORT (OUTPATIENT)
Dept: FAMILY MEDICINE CLINIC | Facility: CLINIC | Age: 51
End: 2021-05-26

## 2021-05-26 VITALS
BODY MASS INDEX: 36.44 KG/M2 | SYSTOLIC BLOOD PRESSURE: 140 MMHG | WEIGHT: 198 LBS | DIASTOLIC BLOOD PRESSURE: 84 MMHG | HEIGHT: 62 IN

## 2021-05-26 DIAGNOSIS — R53.83 FATIGUE, UNSPECIFIED TYPE: ICD-10-CM

## 2021-05-26 DIAGNOSIS — A08.39 DIARRHEA DUE TO COVID-19: ICD-10-CM

## 2021-05-26 DIAGNOSIS — Z12.39 ENCOUNTER FOR BREAST CANCER SCREENING USING NON-MAMMOGRAM MODALITY: ICD-10-CM

## 2021-05-26 DIAGNOSIS — U07.1 DIARRHEA DUE TO COVID-19: ICD-10-CM

## 2021-05-26 DIAGNOSIS — N95.1 MENOPAUSAL SYMPTOMS: ICD-10-CM

## 2021-05-26 DIAGNOSIS — Z01.419 ENCOUNTER FOR GYNECOLOGICAL EXAMINATION WITHOUT ABNORMAL FINDING: Primary | ICD-10-CM

## 2021-05-26 PROCEDURE — 99213 OFFICE O/P EST LOW 20 MIN: CPT | Performed by: OBSTETRICS & GYNECOLOGY

## 2021-05-26 PROCEDURE — 96372 THER/PROPH/DIAG INJ SC/IM: CPT | Performed by: FAMILY MEDICINE

## 2021-05-26 PROCEDURE — 99396 PREV VISIT EST AGE 40-64: CPT | Performed by: OBSTETRICS & GYNECOLOGY

## 2021-05-26 RX ORDER — ESTRADIOL 2 MG/1
2 TABLET ORAL DAILY
Qty: 90 TABLET | Refills: 3 | Status: SHIPPED | OUTPATIENT
Start: 2021-05-26 | End: 2022-06-22 | Stop reason: SDUPTHER

## 2021-05-26 RX ORDER — MONTELUKAST SODIUM 4 MG/1
TABLET, CHEWABLE ORAL
Qty: 60 TABLET | Refills: 0 | Status: SHIPPED | OUTPATIENT
Start: 2021-05-26 | End: 2021-06-23 | Stop reason: SDUPTHER

## 2021-05-26 RX ADMIN — CYANOCOBALAMIN 1000 MCG: 1000 INJECTION, SOLUTION INTRAMUSCULAR; SUBCUTANEOUS at 15:26

## 2021-05-26 NOTE — PROGRESS NOTES
Subjective    Chief Complaint   Patient presents with   • Gynecologic Exam     AE, Discuss hormone pills issue with mood and hot flashes       History of Present Illness    Nae Nicole is a 51 y.o. female who presents for annual exam.  Non-smoker.  Mammogram this month negative.  DEXA scan 2019 normal.  Previous hysterectomy with BSO.  Colonoscopy consultation appointment scheduled for next month.  Patient having issues with hot flashes and insomnia and emotional lability on 1 mg of estradiol and would like to increase to 2 mg.  No other issues.    Obstetric History:  OB History        2    Para   2    Term   2            AB        Living           SAB        TAB        Ectopic        Molar        Multiple        Live Births                   Menstrual History:     No LMP recorded. Patient has had a hysterectomy.       Past Medical History:   Diagnosis Date   • Abnormal thyroid blood test    • Abnormal weight gain    • GERD (gastroesophageal reflux disease)    • Hypertension    • Lipid screening 2016   • Migraine    • Obesity    • Snores      Family History   Problem Relation Age of Onset   • Hypertension Father    • Hypothyroidism Maternal Aunt    • Diabetes Maternal Grandmother      Social History     Tobacco Use   Smoking Status Never Smoker   Smokeless Tobacco Never Used         The following portions of the patient's history were reviewed and updated as appropriate: allergies, current medications, past family history, past medical history, past social history, past surgical history and problem list.    Review of Systems   Constitutional: Negative.  Negative for fever and unexpected weight change.   HENT: Negative.    Respiratory: Negative for shortness of breath and wheezing.    Cardiovascular: Negative for chest pain, palpitations and leg swelling.   Gastrointestinal: Negative for abdominal pain, anal bleeding and blood in stool.   Genitourinary: Negative for dysuria, pelvic pain,  urgency, vaginal bleeding, vaginal discharge and vaginal pain.   Skin: Negative.    Neurological: Negative.         Positive for hot flashes   Hematological: Negative.  Negative for adenopathy.   Psychiatric/Behavioral: Negative.  Negative for dysphoric mood. The patient is not nervous/anxious.             Objective   Physical Exam  Vitals reviewed. Exam conducted with a chaperone present.   Constitutional:       Appearance: She is well-developed.   HENT:      Head: Normocephalic.   Eyes:      Pupils: Pupils are equal, round, and reactive to light.   Neck:      Thyroid: No thyromegaly.      Trachea: No tracheal deviation.   Cardiovascular:      Rate and Rhythm: Normal rate and regular rhythm.      Heart sounds: Normal heart sounds. No murmur heard.     Pulmonary:      Effort: Pulmonary effort is normal. No respiratory distress.      Breath sounds: Normal breath sounds.   Chest:      Breasts:         Right: Normal. No mass, nipple discharge or tenderness.         Left: Normal. No mass, nipple discharge or tenderness.   Abdominal:      Palpations: Abdomen is soft. There is no mass.      Tenderness: There is no abdominal tenderness.      Hernia: No hernia is present.   Genitourinary:     General: Normal vulva.      Labia:         Right: No tenderness or lesion.         Left: No tenderness or lesion.       Urethra: No prolapse or urethral lesion.      Vagina: Normal. No vaginal discharge.      Uterus: Absent.       Adnexa:         Right: No fullness.          Left: No fullness.        Rectum: Normal. No external hemorrhoid or internal hemorrhoid. Normal anal tone.      Comments: External genitalia normal  Lymphadenopathy:      Cervical: No cervical adenopathy.      Upper Body:      Right upper body: No axillary adenopathy.      Left upper body: No axillary adenopathy.   Skin:     General: Skin is warm and dry.      Findings: No rash.   Neurological:      Mental Status: She is alert and oriented to person, place, and  "time.   Psychiatric:         Behavior: Behavior normal.         /84   Ht 157.5 cm (62\")   Wt 89.8 kg (198 lb)   BMI 36.21 kg/m²     Assessment/Plan   Diagnoses and all orders for this visit:    1. Encounter for gynecological examination without abnormal finding (Primary)  -     IGP,rfx Aptima HPV All Pth    2. Encounter for breast cancer screening using non-mammogram modality    3. Menopausal symptoms    Other orders  -     estradiol (ESTRACE) 2 MG tablet; Take 1 tablet by mouth Daily.  Dispense: 90 tablet; Refill: 3        Return to office 1 year.  Changing to 2 mg of estradiol daily.  Counseled about taking calcium with vitamin D twice daily.             "

## 2021-05-28 LAB
CONV .: NORMAL
CYTOLOGIST CVX/VAG CYTO: NORMAL
CYTOLOGY CVX/VAG DOC CYTO: NORMAL
CYTOLOGY CVX/VAG DOC THIN PREP: NORMAL
DX ICD CODE: NORMAL
HIV 1 & 2 AB SER-IMP: NORMAL
OTHER STN SPEC: NORMAL
STAT OF ADQ CVX/VAG CYTO-IMP: NORMAL

## 2021-06-23 DIAGNOSIS — U07.1 DIARRHEA DUE TO COVID-19: ICD-10-CM

## 2021-06-23 DIAGNOSIS — A08.39 DIARRHEA DUE TO COVID-19: ICD-10-CM

## 2021-06-23 RX ORDER — MONTELUKAST SODIUM 4 MG/1
1 TABLET, CHEWABLE ORAL 2 TIMES DAILY
Qty: 180 TABLET | Refills: 0 | Status: SHIPPED | OUTPATIENT
Start: 2021-06-23 | End: 2023-03-09

## 2021-07-22 ENCOUNTER — OFFICE VISIT (OUTPATIENT)
Dept: FAMILY MEDICINE CLINIC | Facility: CLINIC | Age: 51
End: 2021-07-22

## 2021-07-22 VITALS
WEIGHT: 198 LBS | BODY MASS INDEX: 36.44 KG/M2 | HEART RATE: 74 BPM | HEIGHT: 62 IN | TEMPERATURE: 96.9 F | OXYGEN SATURATION: 98 % | SYSTOLIC BLOOD PRESSURE: 136 MMHG | DIASTOLIC BLOOD PRESSURE: 82 MMHG

## 2021-07-22 DIAGNOSIS — R09.89 DECREASED PULSE: ICD-10-CM

## 2021-07-22 DIAGNOSIS — I73.00 RAYNAUD'S PHENOMENON WITHOUT GANGRENE: ICD-10-CM

## 2021-07-22 DIAGNOSIS — I10 BENIGN ESSENTIAL HYPERTENSION: Primary | ICD-10-CM

## 2021-07-22 DIAGNOSIS — M79.671 RIGHT FOOT PAIN: ICD-10-CM

## 2021-07-22 PROCEDURE — 99214 OFFICE O/P EST MOD 30 MIN: CPT | Performed by: NURSE PRACTITIONER

## 2021-07-22 NOTE — PROGRESS NOTES
"Subjective   Nae Nicole is a 51 y.o. female who presents c/o numbness and pain in first 3 toes of right foot.    History of Present Illness   Pain worse with time, even though started B12. Pain is worse. Worse with standing, not much swelling. + discoloration  Had COVID in November  The following portions of the patient's history were reviewed and updated as appropriate: allergies, current medications, past family history, past medical history, past social history, past surgical history and problem list.    Review of Systems   Constitutional: Positive for activity change. Negative for fatigue.   HENT: Negative.    Respiratory: Negative.    Cardiovascular: Positive for leg swelling.   Gastrointestinal: Negative.    Genitourinary: Negative.    Musculoskeletal: Positive for arthralgias.   Skin: Positive for color change. Negative for rash.   Neurological: Negative.    Hematological: Negative.    Psychiatric/Behavioral: Negative.         /82   Pulse 74   Temp 96.9 °F (36.1 °C) (Infrared)   Ht 157.5 cm (62\")   Wt 89.8 kg (198 lb)   SpO2 98%   BMI 36.21 kg/m²      Objective   Physical Exam  Constitutional:       Appearance: Normal appearance.   Musculoskeletal:      Right hand: Decreased capillary refill. Normal pulse.      Right foot: Decreased capillary refill. Normal range of motion. Swelling, tenderness and bony tenderness present. No foot drop. Abnormal pulse.      Left foot: Decreased capillary refill.   Skin:     Coloration: Skin is cyanotic.      Findings: Erythema present.      Comments: Toes when dependent   Neurological:      Mental Status: She is alert.       Assessment/Plan   Problems Addressed this Visit        Cardiac and Vasculature    Benign essential hypertension - Primary      Other Visit Diagnoses     Raynaud's phenomenon without gangrene        Decreased pulse        Right foot pain          Diagnoses       Codes Comments    Benign essential hypertension    -  Primary ICD-10-CM: " I10  ICD-9-CM: 401.1     Raynaud's phenomenon without gangrene     ICD-10-CM: I73.00  ICD-9-CM: 443.0     Decreased pulse     ICD-10-CM: R09.89  ICD-9-CM: 785.9     Right foot pain     ICD-10-CM: M79.671  ICD-9-CM: 729.5         HTN--controlled but add CCB for color change,   raynauds--as clearly dependent and not obviously temperature related,,vascular evaluation recommended  Decreased pulse--no smoking history  R foot pain--worsening--favor vascular, but imaging if testing negative    This document is intended for medical expert use only. Reading of this document by patients and/or patient's family without participating medical staff guidance may result in misinterpretation and unintended morbidity.  Any interpretation of such data is the responsibility of the patient and/or family member responsible for the patient in concert with their primary or specialist providers, not to be left for sources of online searches such as [employer], [employer] or similar queries. Relying on these approaches to knowledge may result in misinterpretation, misguided goals of care and even death should patients or family members try recommendations outside of the realm of professional medical care in a supervised way.    Please allow 3-5 business days for recommendations based on new results    Go to the ER for any possible lifethreatening symptoms such as chest pain or shortness of air.     I personally spent 25 minutes reviewing the chart before the visit, time with the patient, and time documenting the visit.

## 2021-08-16 ENCOUNTER — TRANSCRIBE ORDERS (OUTPATIENT)
Dept: ADMINISTRATIVE | Facility: HOSPITAL | Age: 51
End: 2021-08-16

## 2021-08-16 DIAGNOSIS — I73.9 PAD (PERIPHERAL ARTERY DISEASE) (HCC): Primary | ICD-10-CM

## 2021-08-21 DIAGNOSIS — I10 BENIGN ESSENTIAL HYPERTENSION: ICD-10-CM

## 2021-08-21 DIAGNOSIS — R09.89 DECREASED PULSE: ICD-10-CM

## 2021-08-21 DIAGNOSIS — I73.00 RAYNAUD'S PHENOMENON WITHOUT GANGRENE: ICD-10-CM

## 2021-08-31 RX ORDER — CYANOCOBALAMIN 1000 UG/ML
INJECTION, SOLUTION INTRAMUSCULAR; SUBCUTANEOUS
Qty: 6 ML | Refills: 0 | Status: SHIPPED | OUTPATIENT
Start: 2021-08-31 | End: 2022-04-21 | Stop reason: SDUPTHER

## 2021-09-16 ENCOUNTER — HOSPITAL ENCOUNTER (OUTPATIENT)
Dept: CARDIOLOGY | Facility: HOSPITAL | Age: 51
Discharge: HOME | End: 2021-09-16
Admitting: SURGERY

## 2021-09-16 DIAGNOSIS — I73.9 PAD (PERIPHERAL ARTERY DISEASE) (HCC): ICD-10-CM

## 2021-09-16 LAB
BH CV LOWER ARTERIAL LEFT ABI RATIO: 1.12
BH CV LOWER ARTERIAL LEFT DORSALIS PEDIS SYS MAX: 194 MMHG
BH CV LOWER ARTERIAL LEFT GREAT TOE SYS MAX: 163 MMHG
BH CV LOWER ARTERIAL LEFT POST TIBIAL SYS MAX: 195 MMHG
BH CV LOWER ARTERIAL LEFT TBI RATIO: 0.94
BH CV LOWER ARTERIAL RIGHT 2ND DIGIT SYS MAX: 122 MMHG
BH CV LOWER ARTERIAL RIGHT 3RD DIGIT SYS MAX: 127 MMHG
BH CV LOWER ARTERIAL RIGHT ABI RATIO: 1.12
BH CV LOWER ARTERIAL RIGHT DORSALIS PEDIS SYS MAX: 188 MMHG
BH CV LOWER ARTERIAL RIGHT GREAT TOE SYS MAX: 146 MMHG
BH CV LOWER ARTERIAL RIGHT POST TIBIAL SYS MAX: 195 MMHG
BH CV LOWER ARTERIAL RIGHT TBI RATIO: 0.84
MAXIMAL PREDICTED HEART RATE: 167 BPM
STRESS TARGET HR: 142 BPM
UPPER ARTERIAL LEFT ARM BRACHIAL SYS MAX: 173 MMHG
UPPER ARTERIAL RIGHT ARM BRACHIAL SYS MAX: 174 MMHG

## 2021-09-16 PROCEDURE — 93922 UPR/L XTREMITY ART 2 LEVELS: CPT

## 2021-10-07 ENCOUNTER — OFFICE VISIT (OUTPATIENT)
Dept: FAMILY MEDICINE CLINIC | Facility: CLINIC | Age: 51
End: 2021-10-07

## 2021-10-07 VITALS
DIASTOLIC BLOOD PRESSURE: 88 MMHG | SYSTOLIC BLOOD PRESSURE: 148 MMHG | HEART RATE: 72 BPM | BODY MASS INDEX: 36.62 KG/M2 | WEIGHT: 199 LBS | HEIGHT: 62 IN | OXYGEN SATURATION: 96 %

## 2021-10-07 DIAGNOSIS — I10 BENIGN ESSENTIAL HYPERTENSION: ICD-10-CM

## 2021-10-07 DIAGNOSIS — R20.0 NUMBNESS OF RIGHT FOOT: Primary | ICD-10-CM

## 2021-10-07 DIAGNOSIS — I73.00 RAYNAUD'S PHENOMENON WITHOUT GANGRENE: ICD-10-CM

## 2021-10-07 PROCEDURE — 99214 OFFICE O/P EST MOD 30 MIN: CPT | Performed by: NURSE PRACTITIONER

## 2021-10-07 RX ORDER — LISINOPRIL 10 MG/1
10 TABLET ORAL DAILY
COMMUNITY
End: 2021-10-07 | Stop reason: SDUPTHER

## 2021-10-07 RX ORDER — AMLODIPINE BESYLATE 10 MG/1
10 TABLET ORAL DAILY
Qty: 90 TABLET | Refills: 0 | Status: SHIPPED | OUTPATIENT
Start: 2021-10-07 | End: 2022-01-10

## 2021-10-07 RX ORDER — LISINOPRIL 10 MG/1
10 TABLET ORAL DAILY
Qty: 90 TABLET | Refills: 1 | Status: SHIPPED | OUTPATIENT
Start: 2021-10-07 | End: 2022-04-10

## 2021-10-07 RX ORDER — ASPIRIN 81 MG/1
81 TABLET ORAL DAILY
Qty: 90 TABLET | Refills: 0
Start: 2021-10-07 | End: 2023-03-09

## 2021-10-07 NOTE — PROGRESS NOTES
"Subjective   Nae Nicole is a 51 y.o. female. Numbness R 3 toes    History of Present Illness   Numbness since May, no back pain. SHAHNAZ normal numbness painful with standing at times. Hurts when sheets touch it. Feet are dusky and worse when dependent. Reports pain R>L foot. Did have surgery on R foot a few years ago. Never smoked.   Stands a lot at work. This exacerbates symptoms  The following portions of the patient's history were reviewed and updated as appropriate: allergies, current medications, past family history, past medical history, past social history, past surgical history and problem list.    Review of Systems   Constitutional: Negative for activity change and appetite change.   HENT: Negative.    Respiratory: Negative.    Cardiovascular: Positive for leg swelling.   Endocrine: Negative.    Musculoskeletal: Positive for myalgias. Negative for back pain and gait problem.   Skin: Positive for color change. Negative for skin lesions and bruise.   Allergic/Immunologic: Negative.    Neurological: Positive for weakness. Negative for headache.   Hematological: Negative.    Psychiatric/Behavioral: Negative.      .vs  /88   Pulse 72   Ht 157.5 cm (62.01\")   Wt 90.3 kg (199 lb)   SpO2 96%   BMI 36.39 kg/m²     Objective   Physical Exam  Vitals and nursing note reviewed.   Constitutional:       General: She is not in acute distress.     Appearance: She is well-developed.   Neck:      Thyroid: No thyromegaly.   Cardiovascular:      Rate and Rhythm: Normal rate and regular rhythm.      Pulses:           Femoral pulses are 2+ on the right side and 2+ on the left side.       Popliteal pulses are 1+ on the right side and 1+ on the left side.        Dorsalis pedis pulses are 1+ on the right side and 1+ on the left side.        Posterior tibial pulses are 1+ on the right side and 1+ on the left side.      Heart sounds: Normal heart sounds.   Pulmonary:      Effort: Pulmonary effort is normal.      Breath " sounds: Normal breath sounds.   Musculoskeletal:         General: No swelling.      Cervical back: Normal range of motion and neck supple.   Skin:     General: Skin is cool and dry.      Capillary Refill: Capillary refill takes more than 3 seconds.      Coloration: Skin is cyanotic (dusky).      Nails: There is no clubbing.   Psychiatric:         Behavior: Behavior normal.         Thought Content: Thought content normal.         Judgment: Judgment normal.       Assessment/Plan   Problems Addressed this Visit        Cardiac and Vasculature    Benign essential hypertension    Relevant Medications    amLODIPine (NORVASC) 10 MG tablet    lisinopril (PRINIVIL,ZESTRIL) 10 MG tablet      Other Visit Diagnoses     Numbness of right foot    -  Primary    Relevant Medications    amLODIPine (NORVASC) 10 MG tablet    aspirin (aspirin) 81 MG EC tablet    Other Relevant Orders    Ambulatory Referral to Orthopedic Surgery    Raynaud's phenomenon without gangrene        Relevant Medications    amLODIPine (NORVASC) 10 MG tablet    aspirin (aspirin) 81 MG EC tablet    Other Relevant Orders    Ambulatory Referral to Orthopedic Surgery      Diagnoses       Codes Comments    Numbness of right foot    -  Primary ICD-10-CM: R20.0  ICD-9-CM: 782.0     Benign essential hypertension     ICD-10-CM: I10  ICD-9-CM: 401.1     Raynaud's phenomenon without gangrene     ICD-10-CM: I73.00  ICD-9-CM: 443.0         Numbness of foot and Hx surgery--raynauds--Add amlodipine, ASA, evaluate for RSD. Will refer to orthopedics to evaluate for RSD as SHAHNAZ normal    This document is intended for medical expert use only. Reading of this document by patients and/or patient's family without participating medical staff guidance may result in misinterpretation and unintended morbidity.  Any interpretation of such data is the responsibility of the patient and/or family member responsible for the patient in concert with their primary or specialist providers, not to  be left for sources of online searches such as [employer], [employer] or similar queries. Relying on these approaches to knowledge may result in misinterpretation, misguided goals of care and even death should patients or family members try recommendations outside of the realm of professional medical care in a supervised way.    Please allow 3-5 business days for recommendations based on new results    Go to the ER for any possible lifethreatening symptoms such as chest pain or shortness of air.     I personally spent 25 minutes reviewing the chart before the visit, time with the patient, and time documenting the visit.

## 2021-10-27 ENCOUNTER — TELEPHONE (OUTPATIENT)
Dept: FAMILY MEDICINE CLINIC | Facility: CLINIC | Age: 51
End: 2021-10-27

## 2021-10-27 NOTE — TELEPHONE ENCOUNTER
Caller: Nae Nicole    Relationship: Self    Best call back number: [phone]    What is the best time to reach you: ANYTIME    What was the call regarding: PATIENT REQUESTING A CALLBACK WITH STATUS OF ORTHOPEDIC REFERRAL.    Do you require a callback: YES

## 2021-10-27 NOTE — TELEPHONE ENCOUNTER
Looks like the referral was faxed somewhere on 10/8/21 but patient has not been contacted by anyone. Can you help?

## 2021-11-24 ENCOUNTER — TELEPHONE (OUTPATIENT)
Dept: FAMILY MEDICINE CLINIC | Facility: CLINIC | Age: 51
End: 2021-11-24

## 2021-11-24 NOTE — TELEPHONE ENCOUNTER
Caller: Nae Nicole    Relationship: Self    Best call back number: [phone] (H)    What medication are you requesting:SOMETHING FOR SINUS INFECTION    What are your current symptoms: EARS POPPING/ DRAINING, HEAD CONGESTION, SORE THROAT    How long have you been experiencing symptoms: 2 DAYS     Have you had these symptoms before:    [x] Yes  [] No    Have you been treated for these symptoms before:   [x] Yes  [] No    If a prescription is needed, what is your preferred pharmacy and phone number:    [facility]/pharmacy #[id number] - [place], [place] - [address] - [phone]  - [phone]   [phone]    Additional notes: PATIENT WOULD LIKE SOMETHING CALLED IN FOR A SINUS INFECTION. STATES SHE CAN NOT COME IN FOR AN APPOINTMENT BECAUSE SHE HAS TO WORK TODAY. PLEASE ADVISE. THANK YOU.

## 2021-12-20 ENCOUNTER — TELEPHONE (OUTPATIENT)
Dept: FAMILY MEDICINE CLINIC | Facility: CLINIC | Age: 51
End: 2021-12-20

## 2021-12-20 DIAGNOSIS — U07.1 COVID-19: ICD-10-CM

## 2021-12-20 RX ORDER — ONDANSETRON 4 MG/1
4 TABLET, ORALLY DISINTEGRATING ORAL EVERY 8 HOURS PRN
Qty: 24 TABLET | Refills: 0 | Status: SHIPPED | OUTPATIENT
Start: 2021-12-20

## 2021-12-20 NOTE — TELEPHONE ENCOUNTER
Caller: Nae Nicole    Relationship: Self    Best call back number: [phone]    Requested Prescriptions:   Requested Prescriptions     Pending Prescriptions Disp Refills   • ondansetron ODT (ZOFRAN-ODT) 4 MG disintegrating tablet 24 tablet 0     Sig: Place 1 tablet on the tongue Every 8 (Eight) Hours As Needed for Nausea or Vomiting.        Pharmacy where request should be sent: [facility]/PHARMACY #[id number] [place], [place] - [address]  [phone] [facility] [phone]      Additional details provided by patient: PATIENT COMPLETELY OUT    Does the patient have less than a 3 day supply:  [x] Yes  [] No    Denise Jha Rep   12/20/21 14:19 EST

## 2022-01-08 DIAGNOSIS — I73.00 RAYNAUD'S PHENOMENON WITHOUT GANGRENE: ICD-10-CM

## 2022-01-08 DIAGNOSIS — R20.0 NUMBNESS OF RIGHT FOOT: ICD-10-CM

## 2022-01-10 RX ORDER — AMLODIPINE BESYLATE 10 MG/1
TABLET ORAL
Qty: 90 TABLET | Refills: 1 | Status: SHIPPED | OUTPATIENT
Start: 2022-01-10 | End: 2022-04-21 | Stop reason: SDUPTHER

## 2022-03-03 DIAGNOSIS — I10 BENIGN ESSENTIAL HYPERTENSION: ICD-10-CM

## 2022-03-03 DIAGNOSIS — R09.89 DECREASED PULSE: ICD-10-CM

## 2022-03-03 DIAGNOSIS — I73.00 RAYNAUD'S PHENOMENON WITHOUT GANGRENE: ICD-10-CM

## 2022-04-10 RX ORDER — LISINOPRIL 10 MG/1
10 TABLET ORAL DAILY
Qty: 90 TABLET | Refills: 0 | Status: SHIPPED | OUTPATIENT
Start: 2022-04-10 | End: 2022-04-21 | Stop reason: SDUPTHER

## 2022-04-21 ENCOUNTER — OFFICE VISIT (OUTPATIENT)
Dept: FAMILY MEDICINE CLINIC | Facility: CLINIC | Age: 52
End: 2022-04-21

## 2022-04-21 VITALS
OXYGEN SATURATION: 98 % | BODY MASS INDEX: 36.44 KG/M2 | SYSTOLIC BLOOD PRESSURE: 128 MMHG | RESPIRATION RATE: 20 BRPM | WEIGHT: 198 LBS | DIASTOLIC BLOOD PRESSURE: 60 MMHG | TEMPERATURE: 98.2 F | HEIGHT: 62 IN

## 2022-04-21 DIAGNOSIS — M67.479 GANGLION CYST OF FOOT: ICD-10-CM

## 2022-04-21 DIAGNOSIS — R23.8 CHANGE OF SKIN COLOR: Primary | ICD-10-CM

## 2022-04-21 DIAGNOSIS — I73.00 RAYNAUD'S PHENOMENON WITHOUT GANGRENE: ICD-10-CM

## 2022-04-21 DIAGNOSIS — R20.0 NUMBNESS OF RIGHT FOOT: ICD-10-CM

## 2022-04-21 DIAGNOSIS — E78.2 MIXED HYPERLIPIDEMIA: ICD-10-CM

## 2022-04-21 DIAGNOSIS — R53.82 CHRONIC FATIGUE: ICD-10-CM

## 2022-04-21 DIAGNOSIS — E66.01 MORBID OBESITY: ICD-10-CM

## 2022-04-21 DIAGNOSIS — E53.8 B12 DEFICIENCY: ICD-10-CM

## 2022-04-21 PROCEDURE — 99214 OFFICE O/P EST MOD 30 MIN: CPT | Performed by: NURSE PRACTITIONER

## 2022-04-21 RX ORDER — CYANOCOBALAMIN 1000 UG/ML
INJECTION, SOLUTION INTRAMUSCULAR; SUBCUTANEOUS
Qty: 6 ML | Refills: 0 | Status: SHIPPED | OUTPATIENT
Start: 2022-04-21 | End: 2023-03-09

## 2022-04-21 RX ORDER — AMLODIPINE BESYLATE 10 MG/1
10 TABLET ORAL DAILY
Qty: 90 TABLET | Refills: 1 | Status: SHIPPED | OUTPATIENT
Start: 2022-04-21

## 2022-04-21 RX ORDER — SYRINGE W-NEEDLE,DISPOSAB,3 ML 25GX5/8"
SYRINGE, EMPTY DISPOSABLE MISCELLANEOUS
Qty: 6 EACH | Refills: 0 | Status: SHIPPED | OUTPATIENT
Start: 2022-04-21 | End: 2023-03-09

## 2022-04-21 RX ORDER — PHENTERMINE HYDROCHLORIDE 30 MG/1
30 CAPSULE ORAL EVERY MORNING
Qty: 30 CAPSULE | Refills: 0 | Status: SHIPPED | OUTPATIENT
Start: 2022-04-21 | End: 2023-03-09

## 2022-04-21 RX ORDER — LISINOPRIL 10 MG/1
10 TABLET ORAL DAILY
Qty: 90 TABLET | Refills: 0 | Status: SHIPPED | OUTPATIENT
Start: 2022-04-21 | End: 2022-11-14 | Stop reason: SDUPTHER

## 2022-06-21 RX ORDER — ESTRADIOL 2 MG/1
TABLET ORAL
Qty: 90 TABLET | Refills: 3 | OUTPATIENT
Start: 2022-06-21

## 2022-06-22 ENCOUNTER — TELEPHONE (OUTPATIENT)
Dept: OBSTETRICS AND GYNECOLOGY | Facility: CLINIC | Age: 52
End: 2022-06-22

## 2022-06-22 RX ORDER — ESTRADIOL 2 MG/1
2 TABLET ORAL DAILY
Qty: 90 TABLET | Refills: 0 | Status: SHIPPED | OUTPATIENT
Start: 2022-06-22 | End: 2022-09-19

## 2022-06-22 NOTE — TELEPHONE ENCOUNTER
Good morning,     Patient is requesting a refill on Rx - estradiol (ESTRACE) 2 MG tablet [[id number]]    If possible by provider.     Please advise,   Thank you     Pharmacy confirmed - [facility]/pharmacy #[id number] - [place], [place] - [address] - [phone] [facility] [phone] FX

## 2022-06-22 NOTE — TELEPHONE ENCOUNTER
Patient has scheduled annual for 8/3/22, is she able to get a refill of the medication until then?    Please advise,   Thank you

## 2022-09-16 ENCOUNTER — TELEPHONE (OUTPATIENT)
Dept: OBSTETRICS AND GYNECOLOGY | Facility: CLINIC | Age: 52
End: 2022-09-16

## 2022-09-16 NOTE — TELEPHONE ENCOUNTER
PT HAD MAMMOGRAM APPT TODAY @ 3:00PM, SHE IS NEEDING TO RESCHEDULE TO A DIFFERENT DAY.  PT RESCHEDULED 09/23 @ 1:45PM.

## 2022-09-19 RX ORDER — ESTRADIOL 2 MG/1
TABLET ORAL
Qty: 90 TABLET | Refills: 0 | Status: SHIPPED | OUTPATIENT
Start: 2022-09-19 | End: 2022-12-27

## 2022-09-23 ENCOUNTER — PROCEDURE VISIT (OUTPATIENT)
Dept: OBSTETRICS AND GYNECOLOGY | Facility: CLINIC | Age: 52
End: 2022-09-23

## 2022-09-23 ENCOUNTER — APPOINTMENT (OUTPATIENT)
Dept: WOMENS IMAGING | Facility: HOSPITAL | Age: 52
End: 2022-09-23

## 2022-09-23 DIAGNOSIS — Z12.31 VISIT FOR SCREENING MAMMOGRAM: Primary | ICD-10-CM

## 2022-09-23 PROCEDURE — 77063 BREAST TOMOSYNTHESIS BI: CPT | Performed by: OBSTETRICS & GYNECOLOGY

## 2022-09-23 PROCEDURE — 77067 SCR MAMMO BI INCL CAD: CPT | Performed by: RADIOLOGY

## 2022-09-23 PROCEDURE — 77063 BREAST TOMOSYNTHESIS BI: CPT | Performed by: RADIOLOGY

## 2022-09-23 PROCEDURE — 77067 SCR MAMMO BI INCL CAD: CPT | Performed by: OBSTETRICS & GYNECOLOGY

## 2022-11-14 DIAGNOSIS — I10 BENIGN ESSENTIAL HYPERTENSION: Primary | ICD-10-CM

## 2022-11-14 RX ORDER — LISINOPRIL 10 MG/1
10 TABLET ORAL DAILY
Qty: 30 TABLET | Refills: 0 | Status: SHIPPED | OUTPATIENT
Start: 2022-11-14 | End: 2023-03-09

## 2022-12-27 RX ORDER — ESTRADIOL 2 MG/1
TABLET ORAL
Qty: 90 TABLET | Refills: 0 | Status: SHIPPED | OUTPATIENT
Start: 2022-12-27 | End: 2023-01-04 | Stop reason: SDUPTHER

## 2023-01-04 ENCOUNTER — TELEPHONE (OUTPATIENT)
Dept: OBSTETRICS AND GYNECOLOGY | Facility: CLINIC | Age: 53
End: 2023-01-04
Payer: COMMERCIAL

## 2023-01-04 RX ORDER — ESTRADIOL 2 MG/1
2 TABLET ORAL DAILY
Qty: 90 TABLET | Refills: 3 | Status: SHIPPED | OUTPATIENT
Start: 2023-01-04 | End: 2023-03-16 | Stop reason: SDUPTHER

## 2023-01-04 NOTE — TELEPHONE ENCOUNTER
Patient is needing refill on     estradiol (ESTRACE) 2 MG tablet [3234]    Sent to [facility]/pharmacy #[id number] - [place], [place] - [address] - [phone]  - [phone]    [address], [facility] [address]   Phone:  [phone]  Fax:  [phone]    Please advise,  Thank you

## 2023-03-09 ENCOUNTER — OFFICE VISIT (OUTPATIENT)
Dept: OBSTETRICS AND GYNECOLOGY | Facility: CLINIC | Age: 53
End: 2023-03-09
Payer: COMMERCIAL

## 2023-03-09 VITALS
DIASTOLIC BLOOD PRESSURE: 77 MMHG | SYSTOLIC BLOOD PRESSURE: 120 MMHG | WEIGHT: 194 LBS | HEIGHT: 62 IN | BODY MASS INDEX: 35.7 KG/M2

## 2023-03-09 DIAGNOSIS — Z01.419 ENCOUNTER FOR GYNECOLOGICAL EXAMINATION WITHOUT ABNORMAL FINDING: Primary | ICD-10-CM

## 2023-03-09 DIAGNOSIS — N95.1 MENOPAUSAL SYMPTOMS: ICD-10-CM

## 2023-03-09 DIAGNOSIS — R63.5 EXCESSIVE WEIGHT GAIN: ICD-10-CM

## 2023-03-09 DIAGNOSIS — N95.1 MENOPAUSAL STATE: ICD-10-CM

## 2023-03-09 DIAGNOSIS — Z12.11 SPECIAL SCREENING FOR MALIGNANT NEOPLASMS, COLON: ICD-10-CM

## 2023-03-09 PROCEDURE — 99396 PREV VISIT EST AGE 40-64: CPT | Performed by: OBSTETRICS & GYNECOLOGY

## 2023-03-09 RX ORDER — HYDROCHLOROTHIAZIDE 25 MG/1
1 TABLET ORAL DAILY
COMMUNITY
Start: 2023-01-04

## 2023-03-09 RX ORDER — LISINOPRIL 20 MG/1
TABLET ORAL
COMMUNITY
Start: 2023-03-03

## 2023-03-09 RX ORDER — DEXTROMETHORPHAN HYDROBROMIDE AND PROMETHAZINE HYDROCHLORIDE 15; 6.25 MG/5ML; MG/5ML
SYRUP ORAL
COMMUNITY
Start: 2022-12-07

## 2023-03-09 NOTE — PROGRESS NOTES
Subjective    Chief Complaint   Patient presents with   • Gynecologic Exam     AE, Having hot flashes      History of Present Illness    Nae Nicole is a 53 y.o. female who presents for annual exam.  Non-smoker.  Mammogram due in September.  Previous hysterectomy with BSO.  Still having hot flashes on 2 mg of estradiol.  Discussed switching to Premarin.  DEXA scan normal .  Wants to wait till next year to get another 1.  Has not had a colonoscopy so wants to have 1 set up.    Obstetric History:  OB History        2    Para   2    Term   2            AB        Living           SAB        IAB        Ectopic        Molar        Multiple        Live Births                   Menstrual History:     No LMP recorded. Patient has had a hysterectomy.       Past Medical History:   Diagnosis Date   • Abnormal thyroid blood test    • Abnormal weight gain    • GERD (gastroesophageal reflux disease)    • Hypertension    • Lipid screening 2016   • Migraine    • Obesity    • Snores      Family History   Problem Relation Age of Onset   • Hypertension Father    • Hypothyroidism Maternal Aunt    • Diabetes Maternal Grandmother      Social History     Tobacco Use   Smoking Status Never   Smokeless Tobacco Never         The following portions of the patient's history were reviewed and updated as appropriate: allergies, current medications, past family history, past medical history, past social history, past surgical history and problem list.    Review of Systems   Constitutional: Positive for unexpected weight change. Negative for fever.   HENT: Negative.    Respiratory: Negative for shortness of breath and wheezing.    Cardiovascular: Negative for chest pain, palpitations and leg swelling.   Gastrointestinal: Negative for abdominal pain, anal bleeding and blood in stool.   Genitourinary: Negative for dysuria, pelvic pain, urgency, vaginal bleeding, vaginal discharge and vaginal pain.   Skin: Negative.   "  Neurological: Negative.    Hematological: Negative.  Negative for adenopathy.   Psychiatric/Behavioral: Negative.  Negative for dysphoric mood. The patient is not nervous/anxious.             Objective   Physical Exam  Vitals reviewed. Exam conducted with a chaperone present.   Constitutional:       Appearance: She is well-developed.   HENT:      Head: Normocephalic.   Eyes:      Pupils: Pupils are equal, round, and reactive to light.   Neck:      Thyroid: No thyromegaly.      Trachea: No tracheal deviation.   Cardiovascular:      Rate and Rhythm: Normal rate and regular rhythm.      Heart sounds: Normal heart sounds. No murmur heard.  Pulmonary:      Effort: Pulmonary effort is normal. No respiratory distress.      Breath sounds: Normal breath sounds.   Chest:   Breasts:     Right: Normal. No mass, nipple discharge or tenderness.      Left: Normal. No mass, nipple discharge or tenderness.   Abdominal:      Palpations: Abdomen is soft. There is no mass.      Tenderness: There is no abdominal tenderness.      Hernia: No hernia is present.   Genitourinary:     General: Normal vulva.      Labia:         Right: No tenderness or lesion.         Left: No tenderness or lesion.       Urethra: No prolapse or urethral lesion.      Vagina: Normal. No vaginal discharge.      Uterus: Absent.       Adnexa:         Right: No fullness.          Left: No fullness.        Rectum: Normal. No external hemorrhoid or internal hemorrhoid. Normal anal tone.      Comments: External genitalia normal  Lymphadenopathy:      Cervical: No cervical adenopathy.      Upper Body:      Right upper body: No axillary adenopathy.      Left upper body: No axillary adenopathy.   Skin:     General: Skin is warm and dry.      Findings: No rash.   Neurological:      Mental Status: She is alert and oriented to person, place, and time.   Psychiatric:         Behavior: Behavior normal.         /77   Ht 157.5 cm (62.01\")   Wt 88 kg (194 lb)   BMI " 35.47 kg/m²     Assessment & Plan   Diagnoses and all orders for this visit:    1. Encounter for gynecological examination without abnormal finding (Primary)  -     IGP,rfx Aptima HPV All Pth    2. Excessive weight gain  -     Thyroid Panel With TSH    3. Menopausal state    4. Menopausal symptoms    5. Special screening for malignant neoplasms, colon  -     Ambulatory Referral to Gastroenterology    Other orders  -     estrogens, conjugated, (Premarin) 1.25 MG tablet; Take 1 tablet by mouth Daily.  Dispense: 90 tablet; Refill: 3        Mammogram.  Being scheduled in September.  Counseled in detail about diet and weight loss.  Will check thyroid tests.  We will try switching to Premarin 1.25 mg if covered by insurance.  Colorectal screening being set up.

## 2023-03-10 LAB
FT4I SERPL CALC-MCNC: 1.8 (ref 1.2–4.9)
T3RU NFR SERPL: 22 % (ref 24–39)
T4 SERPL-MCNC: 8.1 UG/DL (ref 4.5–12)
TSH SERPL DL<=0.005 MIU/L-ACNC: 2.28 UIU/ML (ref 0.45–4.5)

## 2023-03-13 ENCOUNTER — TELEPHONE (OUTPATIENT)
Dept: OBSTETRICS AND GYNECOLOGY | Facility: CLINIC | Age: 53
End: 2023-03-13
Payer: COMMERCIAL

## 2023-03-13 NOTE — TELEPHONE ENCOUNTER
----- Message from Dionicio Alfaro MD sent at 3/13/2023 12:15 PM EDT -----  Ning please tell patient that her thyroid tests are normal.  Thank you.

## 2023-03-15 ENCOUNTER — TELEPHONE (OUTPATIENT)
Dept: OBSTETRICS AND GYNECOLOGY | Facility: CLINIC | Age: 53
End: 2023-03-15
Payer: COMMERCIAL

## 2023-03-15 NOTE — TELEPHONE ENCOUNTER
----- Message from Dionicio Alfaro MD sent at 3/13/2023 12:29 PM EDT -----  Ning can you see if we can get a pre-cert for Premarin 1.25 mg tablets since patient was having hot flashes on 2 mg of estradiol.  ----- Message -----  From: Luz Elena Gee MA  Sent: 3/10/2023   8:03 AM EDT  To: Dionicio Alfaro MD

## 2023-03-16 RX ORDER — ESTRADIOL 2 MG/1
2 TABLET ORAL DAILY
Qty: 90 TABLET | Refills: 3 | Status: SHIPPED | OUTPATIENT
Start: 2023-03-16

## 2023-03-16 NOTE — TELEPHONE ENCOUNTER
Pt aware and would like estradiol 2mg sent to [facility] that is in chart. Aware will be sent in today sometime. Thank you.

## 2024-01-11 RX ORDER — ESTRADIOL 2 MG/1
2 TABLET ORAL DAILY
Qty: 90 TABLET | Refills: 0 | Status: SHIPPED | OUTPATIENT
Start: 2024-01-11

## 2024-04-09 RX ORDER — ESTRADIOL 2 MG/1
2 TABLET ORAL DAILY
Qty: 30 TABLET | Refills: 0 | Status: SHIPPED | OUTPATIENT
Start: 2024-04-09

## 2024-04-29 ENCOUNTER — TELEPHONE (OUTPATIENT)
Dept: OBSTETRICS AND GYNECOLOGY | Facility: CLINIC | Age: 54
End: 2024-04-29

## 2024-04-29 NOTE — TELEPHONE ENCOUNTER
Provider: DR SANTIAGO    Caller: ALLISON HUGHES    Phone Number: [phone]    Reason for Call: SAME DAY CANCELLATION FOR ANNUAL @ 1:15pm AND MAMMO @ 1:45pm - PT ISN'T FEELING WELL @ [facility] - HUB R/S FOR 06/14/24 ANNUAL @ 2pm & MAMMO @ 2:45pm

## 2024-05-08 RX ORDER — ESTRADIOL 2 MG/1
2 TABLET ORAL DAILY
Qty: 30 TABLET | Refills: 0 | Status: SHIPPED | OUTPATIENT
Start: 2024-05-08

## 2024-05-20 RX ORDER — ESTRADIOL 2 MG/1
2 TABLET ORAL DAILY
Qty: 30 TABLET | Refills: 0 | Status: SHIPPED | OUTPATIENT
Start: 2024-05-20

## 2024-06-05 RX ORDER — ESTRADIOL 2 MG/1
2 TABLET ORAL DAILY
Qty: 30 TABLET | Refills: 1 | Status: SHIPPED | OUTPATIENT
Start: 2024-06-05

## 2024-06-14 ENCOUNTER — OFFICE VISIT (OUTPATIENT)
Dept: OBSTETRICS AND GYNECOLOGY | Facility: CLINIC | Age: 54
End: 2024-06-14
Payer: COMMERCIAL

## 2024-06-14 ENCOUNTER — PROCEDURE VISIT (OUTPATIENT)
Dept: OBSTETRICS AND GYNECOLOGY | Facility: CLINIC | Age: 54
End: 2024-06-14
Payer: COMMERCIAL

## 2024-06-14 VITALS
WEIGHT: 170 LBS | BODY MASS INDEX: 31.28 KG/M2 | SYSTOLIC BLOOD PRESSURE: 104 MMHG | DIASTOLIC BLOOD PRESSURE: 69 MMHG | HEIGHT: 62 IN

## 2024-06-14 DIAGNOSIS — Z12.11 COLON CANCER SCREENING: ICD-10-CM

## 2024-06-14 DIAGNOSIS — Z12.31 VISIT FOR SCREENING MAMMOGRAM: Primary | ICD-10-CM

## 2024-06-14 DIAGNOSIS — Z01.419 VISIT FOR GYNECOLOGIC EXAMINATION: Primary | ICD-10-CM

## 2024-06-14 DIAGNOSIS — N95.1 MENOPAUSAL SYMPTOMS: ICD-10-CM

## 2024-06-14 LAB
DEVELOPER EXPIRATION DATE: NORMAL
DEVELOPER LOT NUMBER: NORMAL
EXPIRATION DATE: NORMAL
FECAL OCCULT BLOOD SCREEN, POC: NEGATIVE
Lab: NORMAL
NEGATIVE CONTROL: NEGATIVE
POSITIVE CONTROL: POSITIVE

## 2024-06-14 RX ORDER — ESTRADIOL 1 MG/1
1 TABLET ORAL DAILY
Qty: 90 TABLET | Refills: 3 | Status: SHIPPED | OUTPATIENT
Start: 2024-06-14 | End: 2025-06-09

## 2024-06-14 RX ORDER — MECLIZINE HYDROCHLORIDE 25 MG/1
1 TABLET ORAL EVERY 12 HOURS SCHEDULED
COMMUNITY
Start: 2024-04-04

## 2024-06-14 RX ORDER — POTASSIUM CHLORIDE 1500 MG/1
1 TABLET, EXTENDED RELEASE ORAL EVERY 12 HOURS SCHEDULED
COMMUNITY
Start: 2024-04-04

## 2024-06-14 RX ORDER — PHENTERMINE HYDROCHLORIDE 37.5 MG/1
TABLET ORAL
COMMUNITY
Start: 2024-03-04

## 2024-06-14 RX ORDER — TOPIRAMATE 25 MG/1
25 TABLET ORAL 2 TIMES DAILY
COMMUNITY

## 2024-06-14 NOTE — PROGRESS NOTES
[facility]  [address]  [place], [place] [address]  Phone: [phone] / Fax:  [phone]      2024    [address] [facility] [address]    Osiris Nova APRN    Chief Complaint   Patient presents with    Gynecologic Exam     Annual Exam, last pap 3-9-23-NL, Hyst. Endometriosis & AUB.. Mammogram today, previous 22 mass in left breast benign-negative. Patient has never had a Colonoscopy or Cologuard. Dexa 3-13-19-NL..       Nae Nicole is here for annual gynecologic exam.  HPI - Patient with previous hysterectomy, including oophorectomy, for benign indications.  Patient had mammogram today with previous normal mammogram in .    Past Medical History:   Diagnosis Date    Abnormal thyroid blood test     Abnormal weight gain     Endometriosis     GERD (gastroesophageal reflux disease)     Hypertension     Lipid screening 2016    Migraine     Obesity     Snores     Vertigo        Past Surgical History:   Procedure Laterality Date    BREAST BIOPSY      BREAST CYST ASPIRATION       SECTION      D & C WITH SUCTION      MAB    HYSTERECTOMY      OOPHORECTOMY         Allergies   Allergen Reactions    Codeine Nausea And Vomiting    Hydrocodone Nausea And Vomiting       Social History     Socioeconomic History    Marital status:    Tobacco Use    Smoking status: Never    Smokeless tobacco: Never   Vaping Use    Vaping status: Never Used   Substance and Sexual Activity    Alcohol use: Yes     Comment: Occ.    Drug use: No    Sexual activity: Yes     Partners: Male     Birth control/protection: Hysterectomy       Family History   Problem Relation Age of Onset    Hypertension Father     No Known Problems Mother     No Known Problems Brother     No Known Problems Brother     Diabetes Maternal Grandmother     Hypothyroidism Maternal Aunt     Breast cancer Neg Hx     Colon cancer Neg Hx        No LMP recorded. Patient has had a hysterectomy.    OB History        "   3    Para   2    Term   2            AB   1    Living             SAB   1    IAB        Ectopic        Molar        Multiple        Live Births   2                Vitals:    24 0907   BP: 104/69   Weight: 77.1 kg (170 lb)   Height: 157.5 cm (62\")       Physical Exam  Constitutional:       Appearance: Normal appearance. She is well-developed.   Genitourinary:      Rectum normal.      Right Labia: No tenderness or lesions.     Left Labia: No tenderness or lesions.     Vaginal cuff intact.       Right Adnexa: not absent.     Left Adnexa: not absent.     Cervix is absent.      Uterus is absent.   Rectum:      No rectal mass or tenderness.   Breasts:     Right: No mass or nipple discharge.      Left: No mass or nipple discharge.   HENT:      Right Ear: External ear normal.      Left Ear: External ear normal.      Nose: Nose normal.   Eyes:      Conjunctiva/sclera: Conjunctivae normal.   Neck:      Thyroid: No thyromegaly.   Cardiovascular:      Rate and Rhythm: Normal rate and regular rhythm.      Heart sounds: Normal heart sounds.   Pulmonary:      Effort: Pulmonary effort is normal.      Breath sounds: No stridor. No wheezing.   Abdominal:      Palpations: Abdomen is soft.      Tenderness: There is no abdominal tenderness. There is no guarding or rebound.   Musculoskeletal:         General: Normal range of motion.      Cervical back: Normal range of motion and neck supple.   Neurological:      Mental Status: She is alert.      Coordination: Coordination normal.   Skin:     General: Skin is warm and dry.   Psychiatric:         Mood and Affect: Mood normal.         Behavior: Behavior normal.         Thought Content: Thought content normal.         Judgment: Judgment normal.   Vitals reviewed. Exam conducted with a chaperone present.         Diagnoses and all orders for this visit:    1. Visit for gynecologic examination (Primary)        -      Discussed importance of regular screening and breast " awareness.  2. Colon cancer screening  -     Ambulatory Referral For Screening Colonoscopy  -     FOBT negative.  Patient to be referred for initial screening.  3. Menopausal symptoms  -     estradiol (ESTRACE) 1 MG tablet; Take 1 tablet by mouth Daily for 360 days. Annual exam due in March, please call the office to schedule  Dispense: 90 tablet; Refill: 3  -     Patient has been on 2 mg of Estrace long term.  Discussed risks including heard disease, stroke, DVT and breast disease.  Recommended decreasing dose and this will be cut to 1 mg.    Jonnathan Henriquez MD

## 2024-07-08 DIAGNOSIS — N95.1 MENOPAUSAL SYMPTOMS: ICD-10-CM

## 2024-07-08 RX ORDER — ESTRADIOL 1 MG/1
1 TABLET ORAL DAILY
Qty: 90 TABLET | Refills: 3 | Status: SHIPPED | OUTPATIENT
Start: 2024-07-08 | End: 2025-07-03

## 2024-08-13 PROBLEM — R20.0 NUMBNESS OF RIGHT FOOT: Status: ACTIVE | Noted: 2024-08-13

## 2024-08-19 ENCOUNTER — OFFICE VISIT (OUTPATIENT)
Age: 54
End: 2024-08-19
Payer: COMMERCIAL

## 2024-08-19 VITALS — BODY MASS INDEX: 30.91 KG/M2 | HEIGHT: 62 IN | WEIGHT: 168 LBS

## 2024-08-19 DIAGNOSIS — I78.1 SPIDER VEINS: Primary | ICD-10-CM

## 2024-08-19 PROCEDURE — 99213 OFFICE O/P EST LOW 20 MIN: CPT | Performed by: NURSE PRACTITIONER

## 2024-08-19 NOTE — PATIENT INSTRUCTIONS
"\"5-0-0-Almost None!\"  Healthy Habits Start Early    EAT 5 OR MORE SERVINGS OF VEGETABLES AND FRUITS EVERY DAY.    Help Nae get three vegetables and two fruits each day. Red, green, yellow, orange...encourage them to try all the colors so they can enjoy different flavors and get more vitamins.    How can I help Nae do this?  ---------------------------------------------  -BE PATIENT WITH Nae, remember it may take 10 times before they start to like new food. So, start with small bites and just keep trying.  -Serve at least one vegetable or fruit at every meal. Even try two. Remember, portions do not have to be as big as you think.  -Encourage eating fruits and vegetables instead of drinking them..it's a better way to get fiber and vitamins..so limit the amount of juice to 1/2 cup per day for children 1-6 years and one cup per day for children 7-18 years of age. Try using 1/2 part water and 1/2 part juice.    Spend less than two hours per day watching television and other screen media. Screen media includes video games, movies and computer use for entertainment.    How can I help Nae do this?  -Turn off the TV at dinner. Dinner is the best time to hang out with your kids and just talk, learn about their day, and tell them about your day. Your kids have a lot to learn from you and dinner is a great time to share.  "

## 2024-08-19 NOTE — PROGRESS NOTES
"Chief Complaint  New patient (Spider veins )    Subjective      History of Present Illness  Nae Nicole presents to [facility] as a new patient with complaints of spider veins.     Past History:  Medical History: has a past medical history of Abnormal thyroid blood test, Abnormal weight gain, Endometriosis, GERD (gastroesophageal reflux disease), Hypertension, Lipid screening (2016), Migraine, Mixed hyperlipidemia, Obesity, Other specified peripheral vascular diseases (2021), Snores, and Vertigo.   Surgical History: has a past surgical history that includes  section; Hysterectomy; Oophorectomy; Breast biopsy; Breast cyst aspiration; and d & c with suction.   Family History: family history includes Diabetes in her maternal grandmother; Hypertension in her father; Hypothyroidism in her maternal aunt; No Known Problems in her brother, brother, and mother.   Social History: reports that she has never smoked. She has never used smokeless tobacco. She reports current alcohol use. She reports that she does not use drugs.    (Not in a hospital admission)     Allergies: Codeine and Hydrocodone     Nae Nicole  reports that she has never smoked. She has never used smokeless tobacco..       Objective   Vital Signs:  Ht 157.5 cm (62\")   Wt 76.2 kg (168 lb)   BMI 30.73 kg/m²   Estimated body mass index is 30.73 kg/m² as calculated from the following:    Height as of this encounter: 157.5 cm (62\").    Weight as of this encounter: 76.2 kg (168 lb).             Physical Exam  Vitals reviewed.   Constitutional:       Appearance: Normal appearance. She is normal weight. She is obese.   Cardiovascular:      Rate and Rhythm: Normal rate and regular rhythm.   Pulmonary:      Breath sounds: Normal breath sounds.   Musculoskeletal:         General: Normal range of motion.   Neurological:      General: No focal deficit present.      Mental Status: She is alert.        Venous " Right leg:Telangiectasia  CEAP Classification right le    Venous Left leg:Telangiectasia  CEAP Classification left le    Result Review :                     Assessment and Plan     Diagnoses and all orders for this visit:    1. Spider veins (Primary)    Nae Nicole is a 54 y.o. female who presents today as a new patient with complaints of spider telangiectasia veins to bilateral lower extremities.  She does not have any varicose veins, swelling, nor pain to her legs.  She has had saline injections twice at a dermatology office in the remote past.  She has lost approximately 35 pounds over the past 6 months with semaglutide.  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  She has never had any surgeries or traumas to her legs.  No history of DVT or superficial thrombophlebitis.  She does get migraines.    We have discussed the natural history and pathophysiology of venous insufficiency.  We have discussed treatment options for telangiectasia veins with injection sclerotherapy versus transdermal laser.  She would be a better candidate for injection sclerotherapy.We have discussed what to expect.  The potential risk of bleeding, infection, DVT, STP, stroke, PE, hyperpigmentation, paresthesia, neuropathy, allergy anaphylaxis, ulceration and matting were discussed.  The sun, travel, and lifting restrictions were reviewed.  She will schedule this at her convenience.            Follow Up     Return for Natasha alicia.  Patient was given instructions and counseling regarding her condition or for health maintenance advice. Please see specific information pulled into the AVS if appropriate.

## 2024-09-18 RX ORDER — ESTRADIOL 2 MG/1
2 TABLET ORAL DAILY
Qty: 30 TABLET | Refills: 0 | OUTPATIENT
Start: 2024-09-18

## 2024-12-19 ENCOUNTER — TELEPHONE (OUTPATIENT)
Dept: OBSTETRICS AND GYNECOLOGY | Facility: CLINIC | Age: 54
End: 2024-12-19
Payer: COMMERCIAL

## 2024-12-19 DIAGNOSIS — N95.1 MENOPAUSAL SYMPTOMS: ICD-10-CM

## 2024-12-19 RX ORDER — ESTRADIOL 2 MG/1
2 TABLET ORAL DAILY
Qty: 90 TABLET | Refills: 1 | Status: SHIPPED | OUTPATIENT
Start: 2024-12-19 | End: 2025-12-14

## 2024-12-19 NOTE — TELEPHONE ENCOUNTER
Rosemary    I increased it.  I would not be able to go any higher as 2 mg is the maximum dosage.    Florence

## 2024-12-19 NOTE — TELEPHONE ENCOUNTER
Patient is wondering if you can up her dosage of estradiol pills if possible, from 1 mg to 2 mg.     Thanks!